# Patient Record
Sex: MALE | Race: WHITE | NOT HISPANIC OR LATINO | Employment: FULL TIME | URBAN - METROPOLITAN AREA
[De-identification: names, ages, dates, MRNs, and addresses within clinical notes are randomized per-mention and may not be internally consistent; named-entity substitution may affect disease eponyms.]

---

## 2018-09-28 ENCOUNTER — OFFICE VISIT (OUTPATIENT)
Dept: URGENT CARE | Facility: CLINIC | Age: 58
End: 2018-09-28
Payer: COMMERCIAL

## 2018-09-28 VITALS
HEIGHT: 70 IN | SYSTOLIC BLOOD PRESSURE: 120 MMHG | WEIGHT: 245 LBS | OXYGEN SATURATION: 97 % | HEART RATE: 72 BPM | TEMPERATURE: 98.2 F | BODY MASS INDEX: 35.07 KG/M2 | RESPIRATION RATE: 16 BRPM | DIASTOLIC BLOOD PRESSURE: 72 MMHG

## 2018-09-28 DIAGNOSIS — G47.30 SLEEP APNEA, UNSPECIFIED TYPE: ICD-10-CM

## 2018-09-28 PROCEDURE — 99202 OFFICE O/P NEW SF 15 MIN: CPT | Performed by: NURSE PRACTITIONER

## 2018-09-28 RX ORDER — MORPHINE SULFATE 15 MG/1
15 TABLET ORAL EVERY 4 HOURS PRN
COMMUNITY
End: 2019-02-15

## 2018-09-28 RX ORDER — GABAPENTIN 300 MG/1
300 CAPSULE ORAL 3 TIMES DAILY
COMMUNITY
End: 2019-04-25

## 2018-09-28 RX ORDER — BUSPIRONE HYDROCHLORIDE 15 MG/1
15 TABLET ORAL 4 TIMES DAILY
COMMUNITY
End: 2019-08-08 | Stop reason: SDUPTHER

## 2018-09-28 RX ORDER — ONDANSETRON 8 MG/1
8 TABLET, ORALLY DISINTEGRATING ORAL EVERY 8 HOURS PRN
COMMUNITY
End: 2021-03-08 | Stop reason: SDUPTHER

## 2018-09-28 RX ORDER — LEVOTHYROXINE SODIUM 0.12 MG/1
125 TABLET ORAL
COMMUNITY
End: 2020-09-16 | Stop reason: SDUPTHER

## 2018-09-28 RX ORDER — MORPHINE SULFATE 30 MG/1
30 TABLET ORAL 3 TIMES DAILY
COMMUNITY
End: 2021-06-29

## 2018-09-28 ASSESSMENT — ENCOUNTER SYMPTOMS: SHORTNESS OF BREATH: 0

## 2018-09-28 NOTE — PROGRESS NOTES
"Subjective:      Christofer Herrera is a 58 y.o. male who presents with Referral Needed (Pulmonologist)    History reviewed. No pertinent past medical history.  Social History     Social History   • Marital status:      Spouse name: N/A   • Number of children: N/A   • Years of education: N/A     Occupational History   • Not on file.     Social History Main Topics   • Smoking status: Never Smoker   • Smokeless tobacco: Never Used   • Alcohol use Not on file   • Drug use: Unknown   • Sexual activity: Not on file     Other Topics Concern   • Not on file     Social History Narrative   • No narrative on file     History reviewed. No pertinent family history.    Allergies: Naproxen    Patient is a 58-year-old male who presents today with request for referral to pulmonology. He has a history of sleep apnea and had an appointment earlier today but was not seen because he did not have a referral. He is in the process of changing insurance and has not had an opportunity to see his new primary care physician yet.           Other   This is a new problem. The problem has been unchanged. Pertinent negatives include no chest pain. Nothing aggravates the symptoms. Treatments tried: CPAP. The treatment provided moderate relief.       Review of Systems   Respiratory: Negative for shortness of breath.    Cardiovascular: Negative for chest pain.   All other systems reviewed and are negative.         Objective:     /72 (BP Location: Right arm, Patient Position: Sitting, BP Cuff Size: Adult)   Pulse 72   Temp 36.8 °C (98.2 °F) (Temporal)   Resp 16   Ht 1.778 m (5' 10\")   Wt 111.1 kg (245 lb)   SpO2 97%   BMI 35.15 kg/m²      Physical Exam   Constitutional: He appears well-developed and well-nourished.   Neck: Normal range of motion. Neck supple.   Cardiovascular: Normal rate and regular rhythm.    Pulmonary/Chest: Effort normal and breath sounds normal.   Skin: Skin is warm and dry. Capillary refill takes less than 2 seconds. "   Psychiatric: He has a normal mood and affect. His behavior is normal.   Vitals reviewed.              Assessment/Plan:   History of sleep apnea  -Referral to pulmonology given  -follow up otherwise for any further questions or concerns.   There are no diagnoses linked to this encounter.

## 2018-10-09 ENCOUNTER — HOSPITAL ENCOUNTER (OUTPATIENT)
Dept: LAB | Facility: MEDICAL CENTER | Age: 58
End: 2018-10-09
Attending: UROLOGY
Payer: COMMERCIAL

## 2018-10-09 PROCEDURE — 84153 ASSAY OF PSA TOTAL: CPT

## 2018-10-09 PROCEDURE — 84403 ASSAY OF TOTAL TESTOSTERONE: CPT

## 2018-10-10 LAB
PSA SERPL-MCNC: 0.95 NG/ML (ref 0–4)
TESTOST SERPL-MCNC: 295 NG/DL (ref 175–781)

## 2018-10-15 ENCOUNTER — OFFICE VISIT (OUTPATIENT)
Dept: MEDICAL GROUP | Facility: PHYSICIAN GROUP | Age: 58
End: 2018-10-15
Payer: COMMERCIAL

## 2018-10-15 VITALS
HEART RATE: 89 BPM | SYSTOLIC BLOOD PRESSURE: 138 MMHG | DIASTOLIC BLOOD PRESSURE: 86 MMHG | TEMPERATURE: 98.5 F | OXYGEN SATURATION: 94 % | HEIGHT: 70 IN | WEIGHT: 245.5 LBS | BODY MASS INDEX: 35.15 KG/M2 | RESPIRATION RATE: 18 BRPM

## 2018-10-15 DIAGNOSIS — G47.30 SLEEP APNEA, UNSPECIFIED TYPE: ICD-10-CM

## 2018-10-15 DIAGNOSIS — Z12.12 SCREENING FOR COLORECTAL CANCER: ICD-10-CM

## 2018-10-15 DIAGNOSIS — Z98.1 HISTORY OF SPINAL FUSION: ICD-10-CM

## 2018-10-15 DIAGNOSIS — Z23 NEED FOR VACCINATION: ICD-10-CM

## 2018-10-15 DIAGNOSIS — E66.9 OBESITY (BMI 35.0-39.9 WITHOUT COMORBIDITY): ICD-10-CM

## 2018-10-15 DIAGNOSIS — M54.40 CHRONIC LOW BACK PAIN WITH SCIATICA, SCIATICA LATERALITY UNSPECIFIED, UNSPECIFIED BACK PAIN LATERALITY: ICD-10-CM

## 2018-10-15 DIAGNOSIS — Z12.11 SCREENING FOR COLORECTAL CANCER: ICD-10-CM

## 2018-10-15 DIAGNOSIS — Z96.89 SPINAL CORD STIMULATOR STATUS: ICD-10-CM

## 2018-10-15 DIAGNOSIS — Z85.46 HISTORY OF PROSTATE CANCER: ICD-10-CM

## 2018-10-15 DIAGNOSIS — G89.29 CHRONIC LOW BACK PAIN WITH SCIATICA, SCIATICA LATERALITY UNSPECIFIED, UNSPECIFIED BACK PAIN LATERALITY: ICD-10-CM

## 2018-10-15 PROCEDURE — 90471 IMMUNIZATION ADMIN: CPT | Performed by: FAMILY MEDICINE

## 2018-10-15 PROCEDURE — 99204 OFFICE O/P NEW MOD 45 MIN: CPT | Mod: 25 | Performed by: FAMILY MEDICINE

## 2018-10-15 PROCEDURE — 90686 IIV4 VACC NO PRSV 0.5 ML IM: CPT | Performed by: FAMILY MEDICINE

## 2018-10-15 RX ORDER — TESTOSTERONE CYPIONATE 1000 MG/10ML
INJECTION, SOLUTION INTRAMUSCULAR
COMMUNITY
End: 2019-04-25

## 2018-10-15 RX ORDER — OXYCODONE HYDROCHLORIDE 10 MG/1
10 TABLET ORAL 4 TIMES DAILY PRN
COMMUNITY

## 2018-10-15 RX ORDER — FAMOTIDINE 40 MG/1
40 TABLET, FILM COATED ORAL PRN
COMMUNITY
Start: 2018-09-14

## 2018-10-15 ASSESSMENT — ENCOUNTER SYMPTOMS
HEMOPTYSIS: 0
PALPITATIONS: 0
CONSTITUTIONAL NEGATIVE: 1
RESPIRATORY NEGATIVE: 1
BACK PAIN: 1
CARDIOVASCULAR NEGATIVE: 1
BRUISES/BLEEDS EASILY: 0
DOUBLE VISION: 0
MYALGIAS: 0
DIZZINESS: 0
CHILLS: 0
EYES NEGATIVE: 1
NEUROLOGICAL NEGATIVE: 1
NAUSEA: 0
TINGLING: 0
GASTROINTESTINAL NEGATIVE: 1
DEPRESSION: 0
BLURRED VISION: 0
HEADACHES: 0
COUGH: 0
FEVER: 0
PSYCHIATRIC NEGATIVE: 1
HEARTBURN: 0

## 2018-10-15 ASSESSMENT — PATIENT HEALTH QUESTIONNAIRE - PHQ9: CLINICAL INTERPRETATION OF PHQ2 SCORE: 0

## 2018-10-15 NOTE — LETTER
[facility]  Dirk Romo M.D.  [address]  [facility] [address]  Fax: [phone]   Authorization for Release/Disclosure of   Protected Health Information   Name: CHRISTOFER PARSONS : 1960 SSN: [id number]   Address: [address] [place] [id number] Phone:    [phone] (home)    I authorize the entity listed below to release/disclose the PHI below to:   [facility]/Dirk Romo M.D. and Dirk Romo M.D.   Provider or Entity Name:     Address   City, State, [facility]   Phone:      Fax:     Reason for request: continuity of care   Information to be released:    [  ] LAST COLONOSCOPY,  including any PATH REPORT and follow-up  [  ] LAST FIT/COLOGUARD RESULT [  ] LAST DEXA  [  ] LAST MAMMOGRAM  [  ] LAST PAP  [  ] LAST LABS [  ] RETINA EXAM REPORT  [  ] IMMUNIZATION RECORDS  [  ] Release all info      [  ] Check here and initial the line next to each item to release ALL health information INCLUDING  _____ Care and treatment for drug and / or alcohol abuse  _____ HIV testing, infection status, or AIDS  _____ Genetic Testing    DATES OF SERVICE OR TIME PERIOD TO BE DISCLOSED: _____________  I understand and acknowledge that:  * This Authorization may be revoked at any time by you in writing, except if your health information has already been used or disclosed.  * Your health information that will be used or disclosed as a result of you signing this authorization could be re-disclosed by the recipient. If this occurs, your re-disclosed health information may no longer be protected by State or Federal laws.  * You may refuse to sign this Authorization. Your refusal will not affect your ability to obtain treatment.  * This Authorization becomes effective upon signing and will  on (date) __________.      If no date is indicated, this Authorization will  one (1) year from the signature date.    Name: Christofer Parsons    Signature:   Date:     10/15/2018       PLEASE FAX REQUESTED RECORDS  BACK TO: [phone]

## 2018-10-15 NOTE — PROGRESS NOTES
Subjective:      Christofer Herrera is a 58 y.o. male who presents with Referral Needed (Pulmonary- Pre auth is what is requested)            New patient visit, works as a  in auto repair business in [place]  Needs new referrals to pulm for sleep apnea, urology for f/u on prostate cancer treated years ago and pain clinic for spinal stimulator status for chronic back pain    1. Screening for colorectal cancer    - REFERRAL TO GI FOR COLONOSCOPY    2. Need for vaccination    - Influenza Vaccine Quad Injection >3Y (PF)    3. Sleep apnea, unspecified type    - REFERRAL TO PULMONOLOGY    4. History of spinal fusion    - REFERRAL TO PAIN CLINIC    5. Chronic low back pain with sciatica, sciatica laterality unspecified, unspecified back pain laterality    - REFERRAL TO PAIN CLINIC    6. Spinal cord stimulator status      7. History of prostate cancer    - REFERRAL TO UROLOGY    8. Obesity (BMI 35.0-39.9 without comorbidity)    - Patient identified as having weight management issue.  Appropriate orders and counseling given.    Past Medical History:  No date: Cancer (HCC)      Comment:  treated with brachytherapy prostate  No date: Glucose intolerance  No date: Low back pain  No date: Sleep apnea  Past Surgical History:  No date: APPENDECTOMY  No date: LAMINOTOMY      Comment:  fusion and stimulator  Smoking status: Never Smoker                                                              Smokeless tobacco: Never Used                        History reviewed.  No pertinent family history.      Current Outpatient Prescriptions: •  oxyCODONE immediate release (ROXICODONE) 10 MG immediate release tablet, Take 10 mg by mouth 3 times a day as needed for Moderate Pain., Disp: , Rfl: •  PENNSAID 2 % Solution, , Disp: , Rfl: •  FLECTOR 1.3 % Patch, Apply 1 Patch to skin as directed every bedtime., Disp: , Rfl: •  famotidine (PEPCID) 40 MG Tab, Take 40 mg by mouth 2 times a day., Disp: , Rfl: •  Testosterone Cypionate 100 MG/ML Solution, by  "Intramuscular route., Disp: , Rfl: •  morphine (MS IR) 30 MG tablet, Take 30 mg by mouth 3 times a day., Disp: , Rfl: •  levothyroxine (SYNTHROID) 125 MCG Tab, Take 125 mcg by mouth Every morning on an empty stomach., Disp: , Rfl: •  gabapentin (NEURONTIN) 300 MG Cap, Take 300 mg by mouth 3 times a day., Disp: , Rfl: •  busPIRone (BUSPAR) 15 MG tablet, Take 15 mg by mouth 2 times a day., Disp: , Rfl: •  ondansetron (ZOFRAN ODT) 8 MG TABLET DISPERSIBLE, Take 8 mg by mouth every 8 hours as needed for Nausea., Disp: , Rfl: •  morphine (MS IR) 15 MG tablet, Take 15 mg by mouth every four hours as needed for Severe Pain., Disp: , Rfl:     Patient was instructed on the use of medications, either prescriptions or OTC and informed on when the appropriate follow up time period should be. In addition, patient was also instructed that should any acute worsening occur that they should notify this clinic asap or call 911.            Review of Systems   Constitutional: Negative.  Negative for chills and fever.   HENT: Negative.  Negative for hearing loss.    Eyes: Negative.  Negative for blurred vision and double vision.   Respiratory: Negative.  Negative for cough and hemoptysis.    Cardiovascular: Negative.  Negative for chest pain and palpitations.   Gastrointestinal: Negative.  Negative for heartburn and nausea.   Genitourinary: Negative.  Negative for dysuria.   Musculoskeletal: Positive for back pain. Negative for myalgias.   Skin: Negative.  Negative for rash.   Neurological: Negative.  Negative for dizziness, tingling and headaches.   Endo/Heme/Allergies: Negative.  Does not bruise/bleed easily.   Psychiatric/Behavioral: Negative.  Negative for depression and suicidal ideas.   All other systems reviewed and are negative.         Objective:     /86 (BP Location: Left arm, Patient Position: Sitting)   Pulse 89   Temp 36.9 °C (98.5 °F) (Temporal)   Resp 18   Ht 1.778 m (5' 10\")   Wt 111.4 kg (245 lb 8 oz)   SpO2 " 94%   BMI 35.23 kg/m²      Physical Exam   Constitutional: He is oriented to person, place, and time. He appears well-developed and well-nourished. No distress.   HENT:   Head: Normocephalic and atraumatic.   Mouth/Throat: Oropharynx is clear and moist. No oropharyngeal exudate.   Eyes: Pupils are equal, round, and reactive to light.   Cardiovascular: Normal rate, regular rhythm, normal heart sounds and intact distal pulses.  Exam reveals no gallop and no friction rub.    No murmur heard.  Pulmonary/Chest: Effort normal and breath sounds normal. No respiratory distress. He has no wheezes. He has no rales. He exhibits no tenderness.   Neurological: He is alert and oriented to person, place, and time.   Skin: He is not diaphoretic.   Psychiatric: He has a normal mood and affect. His behavior is normal. Judgment and thought content normal.   Nursing note and vitals reviewed.              Assessment/Plan:     1. Screening for colorectal cancer    - REFERRAL TO GI FOR COLONOSCOPY    2. Need for vaccination    - Influenza Vaccine Quad Injection >3Y (PF)    3. Sleep apnea, unspecified type    - REFERRAL TO PULMONOLOGY    4. History of spinal fusion    - REFERRAL TO PAIN CLINIC    5. Chronic low back pain with sciatica, sciatica laterality unspecified, unspecified back pain laterality    - REFERRAL TO PAIN CLINIC    6. Spinal cord stimulator status      7. History of prostate cancer    - REFERRAL TO UROLOGY    8. Obesity (BMI 35.0-39.9 without comorbidity)    - Patient identified as having weight management issue.  Appropriate orders and counseling given.

## 2019-02-13 ENCOUNTER — HOSPITAL ENCOUNTER (EMERGENCY)
Facility: MEDICAL CENTER | Age: 59
End: 2019-02-13
Attending: EMERGENCY MEDICINE
Payer: COMMERCIAL

## 2019-02-13 ENCOUNTER — APPOINTMENT (OUTPATIENT)
Dept: RADIOLOGY | Facility: MEDICAL CENTER | Age: 59
End: 2019-02-13
Attending: EMERGENCY MEDICINE
Payer: COMMERCIAL

## 2019-02-13 VITALS
HEART RATE: 70 BPM | TEMPERATURE: 98.1 F | BODY MASS INDEX: 36.38 KG/M2 | RESPIRATION RATE: 16 BRPM | SYSTOLIC BLOOD PRESSURE: 151 MMHG | WEIGHT: 253.53 LBS | DIASTOLIC BLOOD PRESSURE: 90 MMHG | OXYGEN SATURATION: 92 %

## 2019-02-13 DIAGNOSIS — R07.9 ACUTE CHEST PAIN: ICD-10-CM

## 2019-02-13 DIAGNOSIS — M79.604 RIGHT LEG PAIN: ICD-10-CM

## 2019-02-13 LAB
ALBUMIN SERPL BCP-MCNC: 4.6 G/DL (ref 3.2–4.9)
ALBUMIN/GLOB SERPL: 1.7 G/DL
ALP SERPL-CCNC: 113 U/L (ref 30–99)
ALT SERPL-CCNC: 24 U/L (ref 2–50)
ANION GAP SERPL CALC-SCNC: 9 MMOL/L (ref 0–11.9)
APTT PPP: 34.8 SEC (ref 24.7–36)
AST SERPL-CCNC: 21 U/L (ref 12–45)
BASOPHILS # BLD AUTO: 0.7 % (ref 0–1.8)
BASOPHILS # BLD: 0.04 K/UL (ref 0–0.12)
BILIRUB SERPL-MCNC: 0.8 MG/DL (ref 0.1–1.5)
BNP SERPL-MCNC: 8 PG/ML (ref 0–100)
BUN SERPL-MCNC: 13 MG/DL (ref 8–22)
CALCIUM SERPL-MCNC: 9.8 MG/DL (ref 8.5–10.5)
CHLORIDE SERPL-SCNC: 105 MMOL/L (ref 96–112)
CO2 SERPL-SCNC: 22 MMOL/L (ref 20–33)
CREAT SERPL-MCNC: 1.02 MG/DL (ref 0.5–1.4)
EKG IMPRESSION: NORMAL
EOSINOPHIL # BLD AUTO: 0.06 K/UL (ref 0–0.51)
EOSINOPHIL NFR BLD: 1 % (ref 0–6.9)
ERYTHROCYTE [DISTWIDTH] IN BLOOD BY AUTOMATED COUNT: 42.9 FL (ref 35.9–50)
GLOBULIN SER CALC-MCNC: 2.7 G/DL (ref 1.9–3.5)
GLUCOSE SERPL-MCNC: 123 MG/DL (ref 65–99)
HCT VFR BLD AUTO: 48.5 % (ref 42–52)
HGB BLD-MCNC: 16 G/DL (ref 14–18)
IMM GRANULOCYTES # BLD AUTO: 0.03 K/UL (ref 0–0.11)
IMM GRANULOCYTES NFR BLD AUTO: 0.5 % (ref 0–0.9)
INR PPP: 1.04 (ref 0.87–1.13)
LIPASE SERPL-CCNC: 3 U/L (ref 11–82)
LYMPHOCYTES # BLD AUTO: 1.13 K/UL (ref 1–4.8)
LYMPHOCYTES NFR BLD: 18.7 % (ref 22–41)
MCH RBC QN AUTO: 31.1 PG (ref 27–33)
MCHC RBC AUTO-ENTMCNC: 33 G/DL (ref 33.7–35.3)
MCV RBC AUTO: 94.4 FL (ref 81.4–97.8)
MONOCYTES # BLD AUTO: 0.31 K/UL (ref 0–0.85)
MONOCYTES NFR BLD AUTO: 5.1 % (ref 0–13.4)
NEUTROPHILS # BLD AUTO: 4.48 K/UL (ref 1.82–7.42)
NEUTROPHILS NFR BLD: 74 % (ref 44–72)
NRBC # BLD AUTO: 0 K/UL
NRBC BLD-RTO: 0 /100 WBC
PLATELET # BLD AUTO: 255 K/UL (ref 164–446)
PMV BLD AUTO: 10 FL (ref 9–12.9)
POTASSIUM SERPL-SCNC: 3.7 MMOL/L (ref 3.6–5.5)
PROT SERPL-MCNC: 7.3 G/DL (ref 6–8.2)
PROTHROMBIN TIME: 13.7 SEC (ref 12–14.6)
RBC # BLD AUTO: 5.14 M/UL (ref 4.7–6.1)
SODIUM SERPL-SCNC: 136 MMOL/L (ref 135–145)
TROPONIN I SERPL-MCNC: <0.01 NG/ML (ref 0–0.04)
TROPONIN I SERPL-MCNC: <0.01 NG/ML (ref 0–0.04)
WBC # BLD AUTO: 6.1 K/UL (ref 4.8–10.8)

## 2019-02-13 PROCEDURE — 71045 X-RAY EXAM CHEST 1 VIEW: CPT

## 2019-02-13 PROCEDURE — 99284 EMERGENCY DEPT VISIT MOD MDM: CPT

## 2019-02-13 PROCEDURE — 93005 ELECTROCARDIOGRAM TRACING: CPT

## 2019-02-13 PROCEDURE — 85610 PROTHROMBIN TIME: CPT

## 2019-02-13 PROCEDURE — 80053 COMPREHEN METABOLIC PANEL: CPT

## 2019-02-13 PROCEDURE — 83880 ASSAY OF NATRIURETIC PEPTIDE: CPT

## 2019-02-13 PROCEDURE — 85730 THROMBOPLASTIN TIME PARTIAL: CPT

## 2019-02-13 PROCEDURE — 84484 ASSAY OF TROPONIN QUANT: CPT

## 2019-02-13 PROCEDURE — 85025 COMPLETE CBC W/AUTO DIFF WBC: CPT

## 2019-02-13 PROCEDURE — 93971 EXTREMITY STUDY: CPT | Mod: RT

## 2019-02-13 PROCEDURE — 36415 COLL VENOUS BLD VENIPUNCTURE: CPT

## 2019-02-13 PROCEDURE — 93005 ELECTROCARDIOGRAM TRACING: CPT | Performed by: EMERGENCY MEDICINE

## 2019-02-13 PROCEDURE — 83690 ASSAY OF LIPASE: CPT

## 2019-02-13 ASSESSMENT — LIFESTYLE VARIABLES: DO YOU DRINK ALCOHOL: NO

## 2019-02-14 ENCOUNTER — TELEPHONE (OUTPATIENT)
Dept: CARDIOLOGY | Facility: MEDICAL CENTER | Age: 59
End: 2019-02-14

## 2019-02-14 NOTE — ED NOTES
Agree with triage note, pt ambulatory to the bathroom and into green 28 with a steady gait. Pt placed on cardiac monitor, bp cuff and pulse ox. EKG obtained in triage.

## 2019-02-14 NOTE — ED PROVIDER NOTES
ER Provider Note     Scribed for Harry Sanchez M.D. by Nelli Jernigan. 2/13/2019, 8:44 PM.    Primary Care Provider: Dirk Romo M.D.  Means of Arrival: Walk in    History obtained from: Patient  History limited by: None     CHIEF COMPLAINT  Chief Complaint   Patient presents with   • Leg Pain     pt states hx of right leg pain that comes and goes/ today pain increased/ pain to be on lateral thigh/ denies trauma   • Chest Pain     pt states left sided CP       HPI  Christofer Herrera is a 58 y.o. male with a history of prediabetes and sleep apnea who presents to the Emergency Department complaining of intermittent right thigh pain onset few months ago with worsening severity. He reports new onset of tingling in bilateral toes. Patient reports he has sustained multiple injuries in the past which resulted in a spine fusion 7 years ago. He denies history of diabetes. He also complains of onset of chest pain 3 hours ago that he states has subsided since. Denies shortness of breath, nausea, or vomiting.     REVIEW OF SYSTEMS  See HPI for further details. All other systems are negative.     PAST MEDICAL HISTORY   has a past medical history of Cancer (HCC); Glucose intolerance; Low back pain; and Sleep apnea.    SURGICAL HISTORY   has a past surgical history that includes laminotomy and appendectomy.    SOCIAL HISTORY  Social History   Substance Use Topics   • Smoking status: Never Smoker   • Smokeless tobacco: Never Used   • Alcohol use None noted       History   Drug Use No     FAMILY HISTORY  None noted    CURRENT MEDICATIONS  No current facility-administered medications on file prior to encounter.      Current Outpatient Prescriptions on File Prior to Encounter   Medication Sig Dispense Refill   • oxyCODONE immediate release (ROXICODONE) 10 MG immediate release tablet Take 10 mg by mouth 3 times a day as needed for Moderate Pain.     • PENNSAID 2 % Solution      • FLECTOR 1.3 % Patch Apply 1 Patch to skin as directed  every bedtime.     • famotidine (PEPCID) 40 MG Tab Take 40 mg by mouth 2 times a day.     • Testosterone Cypionate 100 MG/ML Solution by Intramuscular route.     • morphine (MS IR) 30 MG tablet Take 30 mg by mouth 3 times a day.     • morphine (MS IR) 15 MG tablet Take 15 mg by mouth every four hours as needed for Severe Pain.     • levothyroxine (SYNTHROID) 125 MCG Tab Take 125 mcg by mouth Every morning on an empty stomach.     • gabapentin (NEURONTIN) 300 MG Cap Take 300 mg by mouth 3 times a day.     • busPIRone (BUSPAR) 15 MG tablet Take 15 mg by mouth 2 times a day.     • ondansetron (ZOFRAN ODT) 8 MG TABLET DISPERSIBLE Take 8 mg by mouth every 8 hours as needed for Nausea.         ALLERGIES  Allergies   Allergen Reactions   • Naproxen Swelling   • Prozac [Fluoxetine] Unspecified     Patient states this would make him unemotional         PHYSICAL EXAM  VITAL SIGNS: /90   Pulse 67   Temp 36.7 °C (98.1 °F) (Temporal)   Resp 15   Wt 115 kg (253 lb 8.5 oz)   SpO2 95%   BMI 36.38 kg/m²      Constitutional: Alert in no apparent distress.  HENT: No signs of trauma, Bilateral external ears normal, Nose normal.   Eyes: Pupils are equal and reactive, Conjunctiva normal, Non-icteric.   Neck: Normal range of motion, No tenderness, Supple, No stridor.   Cardiovascular: Regular rate and rhythm, no murmurs.   Thorax & Lungs: Normal breath sounds, No respiratory distress, No wheezing, No chest tenderness.   Abdomen: Bowel sounds normal, Soft, No tenderness, No masses, No pulsatile masses. No peritoneal signs.  Skin: Warm, Dry, No erythema, No rash.   Extremities: Strong dorsalis pedis pulses, Slight peripheral edema to bilateral lower extremities, Slightly greater on right than left. No tenderness, No cyanosis.  Musculoskeletal: Good range of motion in all major joints. No tenderness to palpation or major deformities noted.   Neurologic: Alert , Normal motor function, Normal sensory function, No focal deficits  noted.   Psychiatric: Affect normal, Judgment normal, Mood normal.     DIAGNOSTIC STUDIES / PROCEDURES    EKG Interpretation:  Interpreted by me    12 Lead EKG interpreted by me to show:  Normal sinus rhythm  Rate 62  Axis: Normal  Intervals: Normal  Normal T waves  Normal ST segments  My impression of this EKG: Does not indicate ischemia or arrhythmia at this time.     LABS  Labs Reviewed   CBC WITH DIFFERENTIAL - Abnormal; Notable for the following:        Result Value    MCHC 33.0 (*)     Neutrophils-Polys 74.00 (*)     Lymphocytes 18.70 (*)     All other components within normal limits    Narrative:     Indicate which anticoagulants the patient is on:->UNKNOWN   COMP METABOLIC PANEL - Abnormal; Notable for the following:     Glucose 123 (*)     Alkaline Phosphatase 113 (*)     All other components within normal limits    Narrative:     Indicate which anticoagulants the patient is on:->UNKNOWN   LIPASE - Abnormal; Notable for the following:     Lipase 3 (*)     All other components within normal limits    Narrative:     Indicate which anticoagulants the patient is on:->UNKNOWN   TROPONIN    Narrative:     Indicate which anticoagulants the patient is on:->UNKNOWN   BTYPE NATRIURETIC PEPTIDE    Narrative:     Indicate which anticoagulants the patient is on:->UNKNOWN   PROTHROMBIN TIME    Narrative:     Indicate which anticoagulants the patient is on:->UNKNOWN   APTT    Narrative:     Indicate which anticoagulants the patient is on:->UNKNOWN   ESTIMATED GFR    Narrative:     Indicate which anticoagulants the patient is on:->UNKNOWN   TROPONIN   All labs reviewed by me.    RADIOLOGY  US-EXTREMITY VENOUS LOWER UNILAT RIGHT   Final Result      DX-CHEST-PORTABLE (1 VIEW)   Final Result      No acute cardiopulmonary abnormality.           The radiologist's interpretation of all radiological studies have been reviewed by me.    COURSE & MEDICAL DECISION MAKING  Pertinent Labs & Imaging studies reviewed. (See chart for  details)    This is a 58 y.o. male that presents with atypical intermittent chest pain however he is more concerned about some leg pain and swelling.  He currently has no chest pain.  I will get an ultrasound to evaluate for DVT.  I will get a troponin to evaluate the patient's chest symptoms looking for evidence of ischemia.  His EKG shows no ischemia at this time.  I will get an x-ray to evaluate for pneumonia.    8:44 PM Patient seen and examined at bedside. Ordered DX chest, US right lower extremity, EKG, Troponin, Estimated GFR, CBC, CMP, and other labs.     10:14 PM Reviewed patient's radiology results which was negative for DVT.     10:56 PM Patient reevaluated at bedside. He is resting comfortably in bed. Discussed diagnostic results with patient which did not show any acute abnormalities. He was instructed to follow up with cardiology in the next 2 days and PCP for further evaluation. Patient understands and is comfortable to discharge home with instructions on supportive care. The patient will return for new or worsening symptoms and is stable at the time of discharge.    The patient has no leukocytosis or anemia.  He has no electrolyte derangements.  His lipase is normal.  He has no coagulopathies and 2- troponins.  His chest x-ray is normal.  His DVT study is also normal.  At this time I will discharge the patient home with strict return precautions and follow-up with his primary care physician.    DISPOSITION:  Patient will be discharged home in stable condition.    FOLLOW UP:  Dirk Romo M.D.  [address] [facility] [address]  [phone]    In 2 days        OUTPATIENT MEDICATIONS:  Discharge Medication List as of 2/13/2019 11:15 PM        FINAL IMPRESSION  1. Acute chest pain    2. Right leg pain          Nelli DELGADO (Eri), am scribing for, and in the presence of, Harry Sanchez M.D..  Electronically signed by: Nelli Kovacs), 2/13/2019  Harry DELGADO M.D.  personally performed the services described in this documentation, as scribed by Nelli Jernigan in my presence, and it is both accurate and complete. C.     The note accurately reflects work and decisions made by me.  Harry Sanchez  2/14/2019  1:49 AM

## 2019-02-14 NOTE — ED TRIAGE NOTES
Chief Complaint   Patient presents with   • Leg Pain     pt states hx of right leg pain that comes and goes/ today pain increased/ pain to be on lateral thigh/ denies trauma   • Chest Pain     pt states left sided CP     PT to triage rm for ekg.

## 2019-02-14 NOTE — ED NOTES
Pt re-vitaled, updated in the chart.   Pt reports leg pain has subsided and chest pain still comes and goes.   Pt also reports concerns of flank pain every morning he wakes up.

## 2019-02-14 NOTE — TELEPHONE ENCOUNTER
Spoke with patient about appointment with [facility]-. Patient has never seen cardiology before. Patient is being seen for EDCP.

## 2019-02-15 ENCOUNTER — OFFICE VISIT (OUTPATIENT)
Dept: CARDIOLOGY | Facility: MEDICAL CENTER | Age: 59
End: 2019-02-15
Payer: COMMERCIAL

## 2019-02-15 VITALS
WEIGHT: 252 LBS | OXYGEN SATURATION: 94 % | DIASTOLIC BLOOD PRESSURE: 80 MMHG | HEART RATE: 98 BPM | BODY MASS INDEX: 36.08 KG/M2 | SYSTOLIC BLOOD PRESSURE: 130 MMHG | HEIGHT: 70 IN

## 2019-02-15 DIAGNOSIS — G47.30 SLEEP APNEA, UNSPECIFIED TYPE: ICD-10-CM

## 2019-02-15 DIAGNOSIS — E66.9 OBESITY (BMI 35.0-39.9 WITHOUT COMORBIDITY): ICD-10-CM

## 2019-02-15 DIAGNOSIS — R73.03 PRE-DIABETES: ICD-10-CM

## 2019-02-15 DIAGNOSIS — Z91.89 OTHER SPECIFIED PERSONAL RISK FACTORS, NOT ELSEWHERE CLASSIFIED: ICD-10-CM

## 2019-02-15 DIAGNOSIS — I20.89 OTHER FORMS OF ANGINA PECTORIS: ICD-10-CM

## 2019-02-15 PROCEDURE — 99244 OFF/OP CNSLTJ NEW/EST MOD 40: CPT | Performed by: INTERNAL MEDICINE

## 2019-02-15 ASSESSMENT — ENCOUNTER SYMPTOMS
WHEEZING: 1
COUGH: 1
BACK PAIN: 1
FALLS: 1
SHORTNESS OF BREATH: 1

## 2019-02-15 NOTE — PROGRESS NOTES
Cardiology Initial Consultation Note    Date of note:    2/15/2019    Primary Care Provider: Dirk Romo M.D.  Referring Provider: Harry Sanchez M.D.     Patient Name: Christofer Herrera   YOB: 1960  MRN:              [id number]    Chief Complaint: chest pain    History of Present Illness: Christofer Herrera is a 58 y.o. male whose current medical problems include pre-diabetes, sleep apnea, obesity  who is here for cardiac consultation for chest pain.    Recently had severe left leg pain and presented to the ER for evaluation on 2/13/2019.  This was associated with 5/10 sharp stabbing left sided chest pain which resolved spontaneously after around 1-2 hours.  This usually resolves with simethicone, and he has these chest pain episodes around twice a month.     Of note, he gets severe gas and reflux with drip coffee.      BP at home is 110s/70s, does have white coat syndrome.     Exercises on elliptical machine 30 minutes.  No chest pain, occasional shortness of breath. Walks 6-8 miles a day at work.     Currently has a URI.     Of note, he does have a history of significant hypertriglyceridemia, which did improve after he stopped eating ice cream every day.       Review of Systems   Cardiovascular: Positive for chest pain and leg swelling.   Respiratory: Positive for cough, shortness of breath and wheezing.    Musculoskeletal: Positive for back pain and falls.       All other systems reviewed and discussed using a comprehensive questionnaire which has been scanned into [employer] as part of this appointment.          Past Medical History:   Diagnosis Date   • Cancer (HCC)     treated with brachytherapy prostate   • Low back pain    • Pre-diabetes    • Sleep apnea          Past Surgical History:   Procedure Laterality Date   • APPENDECTOMY     • LAMINOTOMY      fusion and stimulator   • SPINAL CORD STIMULATOR      failed lead, off as of 12/2018         Current Outpatient Prescriptions   Medication  "Sig Dispense Refill   • oxyCODONE immediate release (ROXICODONE) 10 MG immediate release tablet Take 10 mg by mouth 3 times a day as needed for Moderate Pain.     • PENNSAID 2 % Solution      • FLECTOR 1.3 % Patch Apply 1 Patch to skin as directed every bedtime.     • Testosterone Cypionate 100 MG/ML Solution by Intramuscular route.     • morphine (MS IR) 30 MG tablet Take 30 mg by mouth 3 times a day.     • levothyroxine (SYNTHROID) 125 MCG Tab Take 125 mcg by mouth Every morning on an empty stomach.     • gabapentin (NEURONTIN) 300 MG Cap Take 300 mg by mouth 3 times a day.     • busPIRone (BUSPAR) 15 MG tablet Take 15 mg by mouth 4 times a day.     • famotidine (PEPCID) 40 MG Tab Take 40 mg by mouth 2 times a day.     • ondansetron (ZOFRAN ODT) 8 MG TABLET DISPERSIBLE Take 8 mg by mouth every 8 hours as needed for Nausea.       No current facility-administered medications for this visit.          Allergies   Allergen Reactions   • Naproxen Swelling   • Prozac [Fluoxetine] Unspecified     Patient states this would make him unemotional           Family History   Problem Relation Age of Onset   • Hypertension Brother 16   • Heart Attack Paternal Grandfather          Social History     Social History   • Marital status:      Spouse name: N/A   • Number of children: N/A   • Years of education: N/A     Occupational History   • Not on file.     Social History Main Topics   • Smoking status: Never Smoker   • Smokeless tobacco: Never Used      Comment: second hand smoke exposure   • Alcohol use 0.0 - 4.2 oz/week   • Drug use: No   • Sexual activity: Yes     Partners: Female      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     Other Topics Concern   • Not on file     Social History Narrative     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          Physical Exam:  Ambulatory Vitals  Blood pressure 130/80, pulse 98, height 1.778 m (5' 10\"), weight 114.3 kg (252 lb), SpO2 94 %.   Oxygen Therapy:  Pulse Oximetry: 94 %  BP Readings from Last 4 " Encounters:   02/15/19 130/80   02/13/19 151/90   10/15/18 138/86   09/28/18 120/72       Weight/BMI: Body mass index is 36.16 kg/m².  Wt Readings from Last 4 Encounters:   02/15/19 114.3 kg (252 lb)   02/13/19 115 kg (253 lb 8.5 oz)   10/15/18 111.4 kg (245 lb 8 oz)   09/28/18 111.1 kg (245 lb)           General: No apparent distress  Eyes: nl conjunctiva  ENT: OP clear, normal external appearance of nose and ears  Neck: JVP 4 cm H2O, no carotid bruits  Lungs: normal respiratory effort, CTAB  Heart: RRR, no murmurs, no rubs or gallops, no edema bilateral lower extremities. No LV/RV heave on cardiac palpatation. 2+ bilateral radial pulses.  2+ bilateral dp pulses.   Abdomen: soft, non tender, non distended, no masses, normal bowel sounds.  No HSM.  Extremities/MSK: no clubbing, no cyanosis  Neurological: No focal sensory deficits  Psychiatric: Appropriate affect, A/O x 3, intact judgement and insight  Skin: Warm extremities      Lab Data Review:  No results found for: CHOLSTRLTOT, LDL, HDL, TRIGLYCERIDE    Lab Results   Component Value Date/Time    SODIUM 136 02/13/2019 05:59 PM    POTASSIUM 3.7 02/13/2019 05:59 PM    CHLORIDE 105 02/13/2019 05:59 PM    CO2 22 02/13/2019 05:59 PM    GLUCOSE 123 (H) 02/13/2019 05:59 PM    BUN 13 02/13/2019 05:59 PM    CREATININE 1.02 02/13/2019 05:59 PM     Lab Results   Component Value Date/Time    ALKPHOSPHAT 113 (H) 02/13/2019 05:59 PM    ASTSGOT 21 02/13/2019 05:59 PM    ALTSGPT 24 02/13/2019 05:59 PM    TBILIRUBIN 0.8 02/13/2019 05:59 PM      Lab Results   Component Value Date/Time    WBC 6.1 02/13/2019 05:59 PM     No components found for: HBGA1C  No components found for: TROPONIN  No components found for: BNP      Cardiac Imaging and Procedures Review:    EKG dated 2/13/2019 : My personal interpretation is NSR, normal EKG.     Radiology test Review:  CXR: 2/13/2019     LUNGS: Clear. No effusions.  PNEUMOTHORAX: None.  LINES AND TUBES: Partially visualized intrathecal lead at  the level of the mid thoracic spine.  MEDIASTINUM: No cardiomegaly.  MUSCULOSKELETAL STRUCTURES: No acute fracture.        Medical Decision Makin. Sleep apnea, unspecified type  Continue CPAP    2. Obesity (BMI 35.0-39.9 without comorbidity) ([facility])  Encouraged improved dietary habits, exercise 4-5 times a week    3. Pre-diabetes  Control with diet/exercise    4. Other forms of angina pectoris ([facility])  Exercise stress test.  If negative, then check CCS, if >100, will start aspirin 81mg PO daily and lipitor 20mg PO daily and check lipids in 3 months.  If <100, will recheck in 5 year.  If 0, no need for recheck, and no need for aspirin, or statin.       5. Other specified personal risk factors, not elsewhere classified  Calcium score.       Return in about 6 months (around 8/15/2019).      Fran Diaz MD, [facility]  [facility], [address]  [address] [address]  Phone: [phone]  Fax: [phone]

## 2019-02-26 ENCOUNTER — HOSPITAL ENCOUNTER (OUTPATIENT)
Dept: RADIOLOGY | Facility: MEDICAL CENTER | Age: 59
End: 2019-02-26
Attending: INTERNAL MEDICINE
Payer: COMMERCIAL

## 2019-02-26 DIAGNOSIS — I20.89 OTHER FORMS OF ANGINA PECTORIS: ICD-10-CM

## 2019-02-26 PROCEDURE — A9502 TC99M TETROFOSMIN: HCPCS

## 2019-02-26 PROCEDURE — 93018 CV STRESS TEST I&R ONLY: CPT | Performed by: INTERNAL MEDICINE

## 2019-02-26 PROCEDURE — 78452 HT MUSCLE IMAGE SPECT MULT: CPT | Mod: 26 | Performed by: INTERNAL MEDICINE

## 2019-02-27 ENCOUNTER — OFFICE VISIT (OUTPATIENT)
Dept: MEDICAL GROUP | Facility: PHYSICIAN GROUP | Age: 59
End: 2019-02-27
Payer: COMMERCIAL

## 2019-02-27 VITALS
OXYGEN SATURATION: 96 % | HEIGHT: 70 IN | WEIGHT: 253 LBS | SYSTOLIC BLOOD PRESSURE: 136 MMHG | BODY MASS INDEX: 36.22 KG/M2 | HEART RATE: 99 BPM | TEMPERATURE: 97.6 F | DIASTOLIC BLOOD PRESSURE: 82 MMHG

## 2019-02-27 DIAGNOSIS — J20.9 ACUTE BRONCHITIS, UNSPECIFIED ORGANISM: ICD-10-CM

## 2019-02-27 PROBLEM — G47.33 OSA ON CPAP: Status: ACTIVE | Noted: 2018-10-15

## 2019-02-27 PROCEDURE — 99214 OFFICE O/P EST MOD 30 MIN: CPT | Performed by: NURSE PRACTITIONER

## 2019-02-27 RX ORDER — METHYLPREDNISOLONE 4 MG/1
TABLET ORAL
Qty: 21 TAB | Refills: 0 | Status: SHIPPED | OUTPATIENT
Start: 2019-02-27 | End: 2019-04-25

## 2019-02-27 RX ORDER — AZITHROMYCIN 250 MG/1
TABLET, FILM COATED ORAL
Qty: 6 TAB | Refills: 0 | Status: SHIPPED | OUTPATIENT
Start: 2019-02-27 | End: 2019-03-04

## 2019-02-27 RX ORDER — ALBUTEROL SULFATE 90 UG/1
2 AEROSOL, METERED RESPIRATORY (INHALATION) EVERY 6 HOURS PRN
Qty: 8.5 G | Refills: 0 | Status: SHIPPED | OUTPATIENT
Start: 2019-02-27 | End: 2020-04-08 | Stop reason: SDUPTHER

## 2019-02-27 NOTE — PROGRESS NOTES
Subjective:     Chief Complaint   Patient presents with   • Congestion     x1 month        HPI  Christofer Herrera is a 58 y.o. male here today for chest congestion. Symptoms started one month ago with chest congestion and cough. Now this past 3 days has worsened with purulent sputum and wheezing. No sinus pressure, sore throat, rhinorrhea. No sob, dyspnea, or hemoptysis. No chest pain or LE edema. No N/V, abd pain, diarrhea. No F/C, fatigue, malaise. Treatments tried: albuterol, steam inhalation, and mucinex. No diagnosed asthma or COPD. No hx of smoking.       The encounter diagnosis was Acute bronchitis, unspecified organism.    Allergies: Naproxen and Prozac [fluoxetine]  Current medicines (including changes today)  Current Outpatient Prescriptions   Medication Sig Dispense Refill   • MethylPREDNISolone (MEDROL DOSEPAK) 4 MG Tablet Therapy Pack Follow package instructions 21 Tab 0   • azithromycin (ZITHROMAX) 250 MG Tab 2 tabs by mouth day 1, 1 tab by mouth days 2-5 6 Tab 0   • albuterol 108 (90 Base) MCG/ACT Aero Soln inhalation aerosol Inhale 2 Puffs by mouth every 6 hours as needed for Shortness of Breath. 8.5 g 0   • oxyCODONE immediate release (ROXICODONE) 10 MG immediate release tablet Take 10 mg by mouth 3 times a day as needed for Moderate Pain.     • PENNSAID 2 % Solution      • FLECTOR 1.3 % Patch Apply 1 Patch to skin as directed every bedtime.     • famotidine (PEPCID) 40 MG Tab Take 40 mg by mouth 2 times a day.     • Testosterone Cypionate 100 MG/ML Solution by Intramuscular route.     • morphine (MS IR) 30 MG tablet Take 30 mg by mouth 3 times a day.     • levothyroxine (SYNTHROID) 125 MCG Tab Take 125 mcg by mouth Every morning on an empty stomach.     • gabapentin (NEURONTIN) 300 MG Cap Take 300 mg by mouth 3 times a day.     • busPIRone (BUSPAR) 15 MG tablet Take 15 mg by mouth 4 times a day.     • ondansetron (ZOFRAN ODT) 8 MG TABLET DISPERSIBLE Take 8 mg by mouth every 8 hours as needed for  "Nausea.       No current facility-administered medications for this visit.        He  has a past medical history of Cancer (HCC); Low back pain; Pre-diabetes; and Sleep apnea. He also has no past medical history of Hyperlipidemia.        ROS  As stated in HPI      Objective:     Blood pressure 136/82, pulse 99, temperature 36.4 °C (97.6 °F), temperature source Temporal, height 1.778 m (5' 10\"), weight 114.8 kg (253 lb), SpO2 96 %. Body mass index is 36.3 kg/m².  Physical Exam:  General: Alert, oriented, in no acute distress.  Eye contact is good, speech goal directed, affect calm  CNs grossly intact.  HEENT: conjunctiva non-injected, sclera non-icteric, EOMs intact. No lid edema or eye drainage.   Pinna normal without skin lesions. TM pearly musa. Gross hearing intact.  Nasal turbinates without edema or drainage.   Oral mucous membranes pink and moist with no lesions. Oropharynx with mild erythema. No exudate   Neck: Supple. No adenopathy or masses in the cervical or supraclavicular regions.  Lungs: unlabored.  Anterior and posterior lobes with expiratory wheezing  CV: regular rate and rhythm.   Ext: no edema  Gait steady.     Assessment and Plan:   Assessment/Plan:  1. Acute bronchitis, unspecified organism  Worsening acute problem. Due to extent of wheezing in lungs, will Rx medrol in addition to z-pack. Enc continue treatment he has been doing as well. Expect improvement in 2-3 days, but expect cough to linger a week following   - MethylPREDNISolone (MEDROL DOSEPAK) 4 MG Tablet Therapy Pack; Follow package instructions  Dispense: 21 Tab; Refill: 0  - azithromycin (ZITHROMAX) 250 MG Tab; 2 tabs by mouth day 1, 1 tab by mouth days 2-5  Dispense: 6 Tab; Refill: 0  - albuterol 108 (90 Base) MCG/ACT Aero Soln inhalation aerosol; Inhale 2 Puffs by mouth every 6 hours as needed for Shortness of Breath.  Dispense: 8.5 g; Refill: 0       Follow up:  Return if symptoms worsen or fail to improve.    Educated in proper " administration of medication(s) ordered today including safety, possible SE, risks, benefits, rationale and alternatives to therapy.   Supportive care, differential diagnoses, and indications for immediate follow-up discussed with patient.    Pathogenesis of diagnosis discussed including typical length and natural progression.    Instructed to return to clinic or nearest emergency department for any change in condition, further concerns, or worsening of symptoms.  Patient states understanding of the plan of care and discharge instructions.      Please note that this dictation was created using voice recognition software. I have made every reasonable attempt to correct obvious errors, but I expect that there are errors of grammar and possibly content that I did not discover before finalizing the note.    Followup: Return if symptoms worsen or fail to improve. sooner should new symptoms or problems arise.

## 2019-03-03 ENCOUNTER — TELEPHONE (OUTPATIENT)
Dept: MEDICAL GROUP | Facility: PHYSICIAN GROUP | Age: 59
End: 2019-03-03

## 2019-03-03 DIAGNOSIS — Z12.11 COLON CANCER SCREENING: ICD-10-CM

## 2019-03-04 ENCOUNTER — HOSPITAL ENCOUNTER (OUTPATIENT)
Dept: RADIOLOGY | Facility: MEDICAL CENTER | Age: 59
End: 2019-03-04
Attending: INTERNAL MEDICINE
Payer: COMMERCIAL

## 2019-03-04 DIAGNOSIS — Z91.89 OTHER SPECIFIED PERSONAL RISK FACTORS, NOT ELSEWHERE CLASSIFIED: ICD-10-CM

## 2019-03-04 PROCEDURE — 4410556 CT-CARDIAC SCORING

## 2019-04-15 ENCOUNTER — OFFICE VISIT (OUTPATIENT)
Dept: MEDICAL GROUP | Facility: PHYSICIAN GROUP | Age: 59
End: 2019-04-15
Payer: COMMERCIAL

## 2019-04-15 VITALS
SYSTOLIC BLOOD PRESSURE: 128 MMHG | WEIGHT: 255 LBS | DIASTOLIC BLOOD PRESSURE: 70 MMHG | HEART RATE: 79 BPM | HEIGHT: 71 IN | OXYGEN SATURATION: 92 % | RESPIRATION RATE: 16 BRPM | BODY MASS INDEX: 35.7 KG/M2 | TEMPERATURE: 98.4 F

## 2019-04-15 DIAGNOSIS — Z01.810 PREOP CARDIOVASCULAR EXAM: ICD-10-CM

## 2019-04-15 DIAGNOSIS — Z98.1 HISTORY OF SPINAL FUSION: ICD-10-CM

## 2019-04-15 DIAGNOSIS — Z96.89 SPINAL CORD STIMULATOR STATUS: ICD-10-CM

## 2019-04-15 PROCEDURE — 99214 OFFICE O/P EST MOD 30 MIN: CPT | Performed by: FAMILY MEDICINE

## 2019-04-15 ASSESSMENT — ENCOUNTER SYMPTOMS
HEADACHES: 0
CHILLS: 0
CARDIOVASCULAR NEGATIVE: 1
BRUISES/BLEEDS EASILY: 0
MYALGIAS: 0
PALPITATIONS: 0
DOUBLE VISION: 0
GASTROINTESTINAL NEGATIVE: 1
BACK PAIN: 1
BLURRED VISION: 0
CONSTITUTIONAL NEGATIVE: 1
RESPIRATORY NEGATIVE: 1
COUGH: 0
HEARTBURN: 0
NAUSEA: 0
DIZZINESS: 0
NEUROLOGICAL NEGATIVE: 1
FEVER: 0
PSYCHIATRIC NEGATIVE: 1
HEMOPTYSIS: 0
TINGLING: 0
EYES NEGATIVE: 1
DEPRESSION: 0

## 2019-04-15 ASSESSMENT — PATIENT HEALTH QUESTIONNAIRE - PHQ9: CLINICAL INTERPRETATION OF PHQ2 SCORE: 0

## 2019-04-15 NOTE — PROGRESS NOTES
Subjective:      Christofer Herrera is a 59 y.o. male who presents with Hospital Follow-up (leg pain)            Patient with a spinal stimulator placed last year but one of the leads came off so scheduled to have this fixed next month  Will need preop labs and cxr  Had stress test done that was normal so no ekg needed  No cp nor sob    1. History of spinal fusion    - Comp Metabolic Panel; Future  - CBC WITH DIFFERENTIAL; Future  - URINALYSIS; Future  - DX-CHEST-2 VIEWS; Future    2. Preop cardiovascular exam    - Comp Metabolic Panel; Future  - CBC WITH DIFFERENTIAL; Future  - URINALYSIS; Future  - DX-CHEST-2 VIEWS; Future    3. Spinal cord stimulator status    - Comp Metabolic Panel; Future  - CBC WITH DIFFERENTIAL; Future  - URINALYSIS; Future  - DX-CHEST-2 VIEWS; Future    Past Medical History:  No date: Cancer (HCC)      Comment:  treated with brachytherapy prostate  No date: Low back pain  No date: Pre-diabetes  No date: Sleep apnea  Past Surgical History:  No date: APPENDECTOMY  No date: LAMINOTOMY      Comment:  fusion and stimulator  No date: SPINAL CORD STIMULATOR      Comment:  failed lead, off as of 12/2018  Smoking status: Never Smoker                                                              Smokeless tobacco: Never Used                      Comment: second hand smoke exposure  Alcohol use: Yes           0.0 - 4.2 oz/week     Standard drinks or equivalent: 0 - 7 per week     Comment: occ    Review of patient's family history indicates:  Problem: Hypertension      Relation: Brother       Age of Onset: 16   Problem: Heart Attack      Relation: Paternal Grandfather       Age of Onset: (Not Specified)       Current Outpatient Prescriptions: •  oxyCODONE immediate release (ROXICODONE) 10 MG immediate release tablet, Take 10 mg by mouth 3 times a day as needed for Moderate Pain., Disp: , Rfl: •  PENNSAID 2 % Solution, , Disp: , Rfl: •  FLECTOR 1.3 % Patch, Apply 1 Patch to skin as directed every bedtime.,  Disp: , Rfl: •  famotidine (PEPCID) 40 MG Tab, Take 40 mg by mouth 2 times a day., Disp: , Rfl: •  Testosterone Cypionate 100 MG/ML Solution, by Intramuscular route., Disp: , Rfl: •  morphine (MS IR) 30 MG tablet, Take 30 mg by mouth 3 times a day., Disp: , Rfl: •  levothyroxine (SYNTHROID) 125 MCG Tab, Take 125 mcg by mouth Every morning on an empty stomach., Disp: , Rfl: •  gabapentin (NEURONTIN) 300 MG Cap, Take 300 mg by mouth 3 times a day., Disp: , Rfl: •  busPIRone (BUSPAR) 15 MG tablet, Take 15 mg by mouth 4 times a day., Disp: , Rfl: •  MethylPREDNISolone (MEDROL DOSEPAK) 4 MG Tablet Therapy Pack, Follow package instructions, Disp: 21 Tab, Rfl: 0•  albuterol 108 (90 Base) MCG/ACT Aero Soln inhalation aerosol, Inhale 2 Puffs by mouth every 6 hours as needed for Shortness of Breath., Disp: 8.5 g, Rfl: 0•  ondansetron (ZOFRAN ODT) 8 MG TABLET DISPERSIBLE, Take 8 mg by mouth every 8 hours as needed for Nausea., Disp: , Rfl:     Patient was instructed on the use of medications, either prescriptions or OTC and informed on when the appropriate follow up time period should be. In addition, patient was also instructed that should any acute worsening occur that they should notify this clinic asap or call 911.            Review of Systems   Constitutional: Negative.  Negative for chills and fever.   HENT: Negative.  Negative for hearing loss.    Eyes: Negative.  Negative for blurred vision and double vision.   Respiratory: Negative.  Negative for cough and hemoptysis.    Cardiovascular: Negative.  Negative for chest pain and palpitations.   Gastrointestinal: Negative.  Negative for heartburn and nausea.   Genitourinary: Negative.  Negative for dysuria.   Musculoskeletal: Positive for back pain. Negative for myalgias.   Skin: Negative.  Negative for rash.   Neurological: Negative.  Negative for dizziness, tingling and headaches.   Endo/Heme/Allergies: Negative.  Does not bruise/bleed easily.   Psychiatric/Behavioral:  "Negative.  Negative for depression and suicidal ideas.   All other systems reviewed and are negative.         Objective:     /70 (BP Location: Right arm, Patient Position: Sitting, BP Cuff Size: Adult)   Pulse 79   Temp 36.9 °C (98.4 °F) (Temporal)   Resp 16   Ht 1.803 m (5' 11\")   Wt 115.7 kg (255 lb)   SpO2 92%   BMI 35.57 kg/m²      Physical Exam   Constitutional: He is oriented to person, place, and time. He appears well-developed and well-nourished. No distress.   HENT:   Head: Normocephalic and atraumatic.   Mouth/Throat: Oropharynx is clear and moist. No oropharyngeal exudate.   Eyes: Pupils are equal, round, and reactive to light.   Cardiovascular: Normal rate, regular rhythm, normal heart sounds and intact distal pulses.  Exam reveals no gallop and no friction rub.    No murmur heard.  Pulmonary/Chest: Effort normal and breath sounds normal. No respiratory distress. He has no wheezes. He has no rales. He exhibits no tenderness.   Musculoskeletal:        Arms:  Subcutaneous stimulator in place   Neurological: He is alert and oriented to person, place, and time.   Skin: He is not diaphoretic.   Psychiatric: He has a normal mood and affect. His behavior is normal. Judgment and thought content normal.   Nursing note and vitals reviewed.              Assessment/Plan:     1. History of spinal fusion    - Comp Metabolic Panel; Future  - CBC WITH DIFFERENTIAL; Future  - URINALYSIS; Future  - DX-CHEST-2 VIEWS; Future    2. Preop cardiovascular exam    - Comp Metabolic Panel; Future  - CBC WITH DIFFERENTIAL; Future  - URINALYSIS; Future  - DX-CHEST-2 VIEWS; Future    3. Spinal cord stimulator status    - Comp Metabolic Panel; Future  - CBC WITH DIFFERENTIAL; Future  - URINALYSIS; Future  - DX-CHEST-2 VIEWS; Future      "

## 2019-04-16 ENCOUNTER — HOSPITAL ENCOUNTER (OUTPATIENT)
Dept: LAB | Facility: MEDICAL CENTER | Age: 59
End: 2019-04-16
Attending: FAMILY MEDICINE
Payer: COMMERCIAL

## 2019-04-16 DIAGNOSIS — Z96.89 SPINAL CORD STIMULATOR STATUS: ICD-10-CM

## 2019-04-16 DIAGNOSIS — Z98.1 HISTORY OF SPINAL FUSION: ICD-10-CM

## 2019-04-16 DIAGNOSIS — Z01.810 PREOP CARDIOVASCULAR EXAM: ICD-10-CM

## 2019-04-16 LAB
ALBUMIN SERPL BCP-MCNC: 4.2 G/DL (ref 3.2–4.9)
ALBUMIN/GLOB SERPL: 2.3 G/DL
ALP SERPL-CCNC: 89 U/L (ref 30–99)
ALT SERPL-CCNC: 28 U/L (ref 2–50)
ANION GAP SERPL CALC-SCNC: 10 MMOL/L (ref 0–11.9)
APPEARANCE UR: CLEAR
AST SERPL-CCNC: 23 U/L (ref 12–45)
BASOPHILS # BLD AUTO: 0.9 % (ref 0–1.8)
BASOPHILS # BLD: 0.04 K/UL (ref 0–0.12)
BILIRUB SERPL-MCNC: 0.9 MG/DL (ref 0.1–1.5)
BILIRUB UR QL STRIP.AUTO: NEGATIVE
BUN SERPL-MCNC: 15 MG/DL (ref 8–22)
CALCIUM SERPL-MCNC: 8.6 MG/DL (ref 8.5–10.5)
CHLORIDE SERPL-SCNC: 106 MMOL/L (ref 96–112)
CO2 SERPL-SCNC: 25 MMOL/L (ref 20–33)
COLOR UR: YELLOW
CREAT SERPL-MCNC: 1.04 MG/DL (ref 0.5–1.4)
EOSINOPHIL # BLD AUTO: 0.24 K/UL (ref 0–0.51)
EOSINOPHIL NFR BLD: 5.3 % (ref 0–6.9)
ERYTHROCYTE [DISTWIDTH] IN BLOOD BY AUTOMATED COUNT: 44.4 FL (ref 35.9–50)
GLOBULIN SER CALC-MCNC: 1.8 G/DL (ref 1.9–3.5)
GLUCOSE SERPL-MCNC: 112 MG/DL (ref 65–99)
GLUCOSE UR STRIP.AUTO-MCNC: NEGATIVE MG/DL
HCT VFR BLD AUTO: 48.1 % (ref 42–52)
HGB BLD-MCNC: 15.8 G/DL (ref 14–18)
IMM GRANULOCYTES # BLD AUTO: 0.01 K/UL (ref 0–0.11)
IMM GRANULOCYTES NFR BLD AUTO: 0.2 % (ref 0–0.9)
KETONES UR STRIP.AUTO-MCNC: NEGATIVE MG/DL
LEUKOCYTE ESTERASE UR QL STRIP.AUTO: NEGATIVE
LYMPHOCYTES # BLD AUTO: 1.15 K/UL (ref 1–4.8)
LYMPHOCYTES NFR BLD: 25.4 % (ref 22–41)
MCH RBC QN AUTO: 31 PG (ref 27–33)
MCHC RBC AUTO-ENTMCNC: 32.8 G/DL (ref 33.7–35.3)
MCV RBC AUTO: 94.5 FL (ref 81.4–97.8)
MICRO URNS: NORMAL
MONOCYTES # BLD AUTO: 0.38 K/UL (ref 0–0.85)
MONOCYTES NFR BLD AUTO: 8.4 % (ref 0–13.4)
NEUTROPHILS # BLD AUTO: 2.71 K/UL (ref 1.82–7.42)
NEUTROPHILS NFR BLD: 59.8 % (ref 44–72)
NITRITE UR QL STRIP.AUTO: NEGATIVE
NRBC # BLD AUTO: 0 K/UL
NRBC BLD-RTO: 0 /100 WBC
PH UR STRIP.AUTO: 6.5 [PH]
PLATELET # BLD AUTO: 218 K/UL (ref 164–446)
PMV BLD AUTO: 10.8 FL (ref 9–12.9)
POTASSIUM SERPL-SCNC: 4 MMOL/L (ref 3.6–5.5)
PROT SERPL-MCNC: 6 G/DL (ref 6–8.2)
PROT UR QL STRIP: NEGATIVE MG/DL
RBC # BLD AUTO: 5.09 M/UL (ref 4.7–6.1)
RBC UR QL AUTO: NEGATIVE
SODIUM SERPL-SCNC: 141 MMOL/L (ref 135–145)
SP GR UR STRIP.AUTO: 1.02
UROBILINOGEN UR STRIP.AUTO-MCNC: 0.2 MG/DL
WBC # BLD AUTO: 4.5 K/UL (ref 4.8–10.8)

## 2019-04-16 PROCEDURE — 80053 COMPREHEN METABOLIC PANEL: CPT

## 2019-04-16 PROCEDURE — 36415 COLL VENOUS BLD VENIPUNCTURE: CPT

## 2019-04-16 PROCEDURE — 85025 COMPLETE CBC W/AUTO DIFF WBC: CPT

## 2019-04-16 PROCEDURE — 81003 URINALYSIS AUTO W/O SCOPE: CPT

## 2019-04-21 ENCOUNTER — APPOINTMENT (OUTPATIENT)
Dept: RADIOLOGY | Facility: IMAGING CENTER | Age: 59
End: 2019-04-21
Attending: FAMILY MEDICINE
Payer: COMMERCIAL

## 2019-04-21 DIAGNOSIS — Z01.810 PREOP CARDIOVASCULAR EXAM: ICD-10-CM

## 2019-04-21 DIAGNOSIS — Z96.89 SPINAL CORD STIMULATOR STATUS: ICD-10-CM

## 2019-04-21 DIAGNOSIS — Z98.1 HISTORY OF SPINAL FUSION: ICD-10-CM

## 2019-04-21 PROCEDURE — 71046 X-RAY EXAM CHEST 2 VIEWS: CPT | Mod: TC | Performed by: FAMILY MEDICINE

## 2019-04-24 ENCOUNTER — OFFICE VISIT (OUTPATIENT)
Dept: MEDICAL GROUP | Facility: PHYSICIAN GROUP | Age: 59
End: 2019-04-24
Payer: COMMERCIAL

## 2019-04-24 ENCOUNTER — HOSPITAL ENCOUNTER (OUTPATIENT)
Dept: LAB | Facility: MEDICAL CENTER | Age: 59
End: 2019-04-24
Attending: RADIOLOGY
Payer: COMMERCIAL

## 2019-04-24 ENCOUNTER — HOSPITAL ENCOUNTER (OUTPATIENT)
Dept: LAB | Facility: MEDICAL CENTER | Age: 59
End: 2019-04-24
Attending: UROLOGY
Payer: COMMERCIAL

## 2019-04-24 VITALS
WEIGHT: 253 LBS | RESPIRATION RATE: 12 BRPM | DIASTOLIC BLOOD PRESSURE: 70 MMHG | SYSTOLIC BLOOD PRESSURE: 140 MMHG | BODY MASS INDEX: 35.42 KG/M2 | HEIGHT: 71 IN | TEMPERATURE: 98.2 F | OXYGEN SATURATION: 95 % | HEART RATE: 72 BPM

## 2019-04-24 DIAGNOSIS — G89.29 CHRONIC LOW BACK PAIN WITH SCIATICA, SCIATICA LATERALITY UNSPECIFIED, UNSPECIFIED BACK PAIN LATERALITY: ICD-10-CM

## 2019-04-24 DIAGNOSIS — Z01.810 PREOP CARDIOVASCULAR EXAM: ICD-10-CM

## 2019-04-24 DIAGNOSIS — M54.40 CHRONIC LOW BACK PAIN WITH SCIATICA, SCIATICA LATERALITY UNSPECIFIED, UNSPECIFIED BACK PAIN LATERALITY: ICD-10-CM

## 2019-04-24 DIAGNOSIS — Z96.89 SPINAL CORD STIMULATOR STATUS: ICD-10-CM

## 2019-04-24 LAB
ALBUMIN SERPL BCP-MCNC: 4.5 G/DL (ref 3.2–4.9)
ALBUMIN/GLOB SERPL: 2 G/DL
ALP SERPL-CCNC: 109 U/L (ref 30–99)
ALT SERPL-CCNC: 28 U/L (ref 2–50)
ANION GAP SERPL CALC-SCNC: 5 MMOL/L (ref 0–11.9)
AST SERPL-CCNC: 22 U/L (ref 12–45)
BASOPHILS # BLD AUTO: 0.8 % (ref 0–1.8)
BASOPHILS # BLD: 0.04 K/UL (ref 0–0.12)
BILIRUB SERPL-MCNC: 0.4 MG/DL (ref 0.1–1.5)
BUN SERPL-MCNC: 14 MG/DL (ref 8–22)
CALCIUM SERPL-MCNC: 9.3 MG/DL (ref 8.5–10.5)
CHLORIDE SERPL-SCNC: 105 MMOL/L (ref 96–112)
CO2 SERPL-SCNC: 28 MMOL/L (ref 20–33)
CREAT SERPL-MCNC: 0.96 MG/DL (ref 0.5–1.4)
EOSINOPHIL # BLD AUTO: 0.27 K/UL (ref 0–0.51)
EOSINOPHIL NFR BLD: 5.3 % (ref 0–6.9)
ERYTHROCYTE [DISTWIDTH] IN BLOOD BY AUTOMATED COUNT: 45.1 FL (ref 35.9–50)
GLOBULIN SER CALC-MCNC: 2.2 G/DL (ref 1.9–3.5)
GLUCOSE SERPL-MCNC: 102 MG/DL (ref 65–99)
HCT VFR BLD AUTO: 51.1 % (ref 42–52)
HGB BLD-MCNC: 16.5 G/DL (ref 14–18)
IMM GRANULOCYTES # BLD AUTO: 0.01 K/UL (ref 0–0.11)
IMM GRANULOCYTES NFR BLD AUTO: 0.2 % (ref 0–0.9)
LYMPHOCYTES # BLD AUTO: 1.48 K/UL (ref 1–4.8)
LYMPHOCYTES NFR BLD: 29.2 % (ref 22–41)
MCH RBC QN AUTO: 30.8 PG (ref 27–33)
MCHC RBC AUTO-ENTMCNC: 32.3 G/DL (ref 33.7–35.3)
MCV RBC AUTO: 95.5 FL (ref 81.4–97.8)
MONOCYTES # BLD AUTO: 0.44 K/UL (ref 0–0.85)
MONOCYTES NFR BLD AUTO: 8.7 % (ref 0–13.4)
NEUTROPHILS # BLD AUTO: 2.82 K/UL (ref 1.82–7.42)
NEUTROPHILS NFR BLD: 55.8 % (ref 44–72)
NRBC # BLD AUTO: 0 K/UL
NRBC BLD-RTO: 0 /100 WBC
PLATELET # BLD AUTO: 271 K/UL (ref 164–446)
PMV BLD AUTO: 10.5 FL (ref 9–12.9)
POTASSIUM SERPL-SCNC: 4.4 MMOL/L (ref 3.6–5.5)
PROT SERPL-MCNC: 6.7 G/DL (ref 6–8.2)
PSA SERPL-MCNC: 0.51 NG/ML (ref 0–4)
RBC # BLD AUTO: 5.35 M/UL (ref 4.7–6.1)
SODIUM SERPL-SCNC: 138 MMOL/L (ref 135–145)
TESTOST SERPL-MCNC: 204 NG/DL (ref 175–781)
WBC # BLD AUTO: 5.1 K/UL (ref 4.8–10.8)

## 2019-04-24 PROCEDURE — 84153 ASSAY OF PSA TOTAL: CPT

## 2019-04-24 PROCEDURE — 99214 OFFICE O/P EST MOD 30 MIN: CPT | Performed by: FAMILY MEDICINE

## 2019-04-24 PROCEDURE — 36415 COLL VENOUS BLD VENIPUNCTURE: CPT

## 2019-04-24 PROCEDURE — 80053 COMPREHEN METABOLIC PANEL: CPT

## 2019-04-24 PROCEDURE — 85025 COMPLETE CBC W/AUTO DIFF WBC: CPT

## 2019-04-24 PROCEDURE — 84403 ASSAY OF TOTAL TESTOSTERONE: CPT

## 2019-04-24 ASSESSMENT — ENCOUNTER SYMPTOMS
HEARTBURN: 0
FEVER: 0
PSYCHIATRIC NEGATIVE: 1
BACK PAIN: 1
PALPITATIONS: 0
BLURRED VISION: 0
DIZZINESS: 0
CHILLS: 0
CONSTITUTIONAL NEGATIVE: 1
BRUISES/BLEEDS EASILY: 0
GASTROINTESTINAL NEGATIVE: 1
RESPIRATORY NEGATIVE: 1
HEMOPTYSIS: 0
NEUROLOGICAL NEGATIVE: 1
DOUBLE VISION: 0
COUGH: 0
HEADACHES: 0
EYES NEGATIVE: 1
NAUSEA: 0
MYALGIAS: 0
TINGLING: 0
DEPRESSION: 0
CARDIOVASCULAR NEGATIVE: 1

## 2019-04-24 NOTE — PROGRESS NOTES
Subjective:      Christofer Herrera is a 59 y.o. male who presents with Medical Clearance            1. Preop cardiovascular exam  Labs and cxr and stress test cleared   No cp nor sob  No history of anesthetic complications  - NON SPECIFIED; Cleared for spinal procedure  Dispense: 1 Each; Refill: 0    2. Chronic low back pain with sciatica, sciatica laterality unspecified, unspecified back pain laterality    - NON SPECIFIED; Cleared for spinal procedure  Dispense: 1 Each; Refill: 0    3. Spinal cord stimulator status    - NON SPECIFIED; Cleared for spinal procedure  Dispense: 1 Each; Refill: 0    Past Medical History:  No date: Cancer (HCC)      Comment:  treated with brachytherapy prostate  No date: Low back pain  No date: Pre-diabetes  No date: Sleep apnea  Past Surgical History:  No date: APPENDECTOMY  No date: LAMINOTOMY      Comment:  fusion and stimulator  No date: SPINAL CORD STIMULATOR      Comment:  failed lead, off as of 12/2018  Smoking status: Never Smoker                                                              Smokeless tobacco: Never Used                      Comment: second hand smoke exposure  Alcohol use: Yes           0.0 - 4.2 oz/week     Standard drinks or equivalent: 0 - 7 per week     Comment: occ    Review of patient's family history indicates:  Problem: Hypertension      Relation: Brother       Age of Onset: 16   Problem: Heart Attack      Relation: Paternal Grandfather       Age of Onset: (Not Specified)       Current Outpatient Prescriptions: •  NON SPECIFIED, Cleared for spinal procedure, Disp: 1 Each, Rfl: 0•  albuterol 108 (90 Base) MCG/ACT Aero Soln inhalation aerosol, Inhale 2 Puffs by mouth every 6 hours as needed for Shortness of Breath., Disp: 8.5 g, Rfl: 0•  oxyCODONE immediate release (ROXICODONE) 10 MG immediate release tablet, Take 10 mg by mouth 3 times a day as needed for Moderate Pain., Disp: , Rfl: •  PENNSAID 2 % Solution, , Disp: , Rfl: •  FLECTOR 1.3 % Patch, Apply 1  Patch to skin as directed every bedtime., Disp: , Rfl: •  famotidine (PEPCID) 40 MG Tab, Take 40 mg by mouth 2 times a day., Disp: , Rfl: •  Testosterone Cypionate 100 MG/ML Solution, by Intramuscular route., Disp: , Rfl: •  morphine (MS IR) 30 MG tablet, Take 30 mg by mouth 3 times a day., Disp: , Rfl: •  levothyroxine (SYNTHROID) 125 MCG Tab, Take 125 mcg by mouth Every morning on an empty stomach., Disp: , Rfl: •  gabapentin (NEURONTIN) 300 MG Cap, Take 300 mg by mouth 3 times a day., Disp: , Rfl: •  busPIRone (BUSPAR) 15 MG tablet, Take 15 mg by mouth 4 times a day., Disp: , Rfl: •  MethylPREDNISolone (MEDROL DOSEPAK) 4 MG Tablet Therapy Pack, Follow package instructions (Patient not taking: Reported on 4/24/2019), Disp: 21 Tab, Rfl: 0•  ondansetron (ZOFRAN ODT) 8 MG TABLET DISPERSIBLE, Take 8 mg by mouth every 8 hours as needed for Nausea., Disp: , Rfl:     Patient was instructed on the use of medications, either prescriptions or OTC and informed on when the appropriate follow up time period should be. In addition, patient was also instructed that should any acute worsening occur that they should notify this clinic asap or call 911.            Review of Systems   Constitutional: Negative.  Negative for chills and fever.   HENT: Negative.  Negative for hearing loss.    Eyes: Negative.  Negative for blurred vision and double vision.   Respiratory: Negative.  Negative for cough and hemoptysis.    Cardiovascular: Negative.  Negative for chest pain and palpitations.   Gastrointestinal: Negative.  Negative for heartburn and nausea.   Genitourinary: Negative.  Negative for dysuria.   Musculoskeletal: Positive for back pain. Negative for myalgias.   Skin: Negative.  Negative for rash.   Neurological: Negative.  Negative for dizziness, tingling and headaches.   Endo/Heme/Allergies: Negative.  Does not bruise/bleed easily.   Psychiatric/Behavioral: Negative.  Negative for depression and suicidal ideas.   All other  "systems reviewed and are negative.         Objective:     /70   Pulse 72   Temp 36.8 °C (98.2 °F)   Resp 12   Ht 1.803 m (5' 11\")   Wt 114.8 kg (253 lb)   SpO2 95%   BMI 35.29 kg/m²      Physical Exam   Constitutional: He is oriented to person, place, and time. He appears well-developed and well-nourished. No distress.   HENT:   Head: Normocephalic and atraumatic.   Mouth/Throat: Oropharynx is clear and moist. No oropharyngeal exudate.   Eyes: Pupils are equal, round, and reactive to light.   Cardiovascular: Normal rate, regular rhythm, normal heart sounds and intact distal pulses.  Exam reveals no gallop and no friction rub.    No murmur heard.  Pulmonary/Chest: Effort normal and breath sounds normal. No respiratory distress. He has no wheezes. He has no rales. He exhibits no tenderness.   Neurological: He is alert and oriented to person, place, and time.   Skin: He is not diaphoretic.   Psychiatric: He has a normal mood and affect. His behavior is normal. Judgment and thought content normal.   Nursing note and vitals reviewed.              Assessment/Plan:     1. Preop cardiovascular exam    - NON SPECIFIED; Cleared for spinal procedure  Dispense: 1 Each; Refill: 0    2. Chronic low back pain with sciatica, sciatica laterality unspecified, unspecified back pain laterality    - NON SPECIFIED; Cleared for spinal procedure  Dispense: 1 Each; Refill: 0    3. Spinal cord stimulator status    - NON SPECIFIED; Cleared for spinal procedure  Dispense: 1 Each; Refill: 0      "

## 2019-04-25 ENCOUNTER — APPOINTMENT (OUTPATIENT)
Dept: ADMISSIONS | Facility: MEDICAL CENTER | Age: 59
End: 2019-04-25
Attending: PAIN MEDICINE
Payer: COMMERCIAL

## 2019-04-25 RX ORDER — POLYETHYLENE GLYCOL 3350 17 G/17G
17 POWDER, FOR SOLUTION ORAL DAILY
COMMUNITY

## 2019-04-25 RX ORDER — TESTOSTERONE CYPIONATE 200 MG/ML
100 INJECTION, SOLUTION INTRAMUSCULAR
COMMUNITY
Start: 2019-03-25

## 2019-04-25 RX ORDER — GABAPENTIN 600 MG/1
600 TABLET ORAL 3 TIMES DAILY
COMMUNITY

## 2019-04-25 NOTE — OR NURSING
"Preadmit appointment: \" Preparing for your Procedure information\" sheet given to patient with verbal and written instructions. Patient instructed to continue prescribed medications through the day before surgery, instructed to take the following medications the day of surgery per anesthesia protocol: Buspirone, Famotidine if needed, Gabapentin, Levothyroxine, Morphine, Ondansetron if needed, Oxycodone if needed as prescribed.  Pt instructed to bring CPAP day of surgery  "

## 2019-04-30 ENCOUNTER — TELEPHONE (OUTPATIENT)
Dept: MEDICAL GROUP | Facility: PHYSICIAN GROUP | Age: 59
End: 2019-04-30

## 2019-04-30 NOTE — TELEPHONE ENCOUNTER
Patient called just to let you know Dr. Smart is refilling the buspirone for the last time and would like you to continue it moving forward.

## 2019-05-02 ENCOUNTER — APPOINTMENT (OUTPATIENT)
Dept: RADIOLOGY | Facility: MEDICAL CENTER | Age: 59
End: 2019-05-02
Attending: PAIN MEDICINE
Payer: COMMERCIAL

## 2019-05-02 ENCOUNTER — HOSPITAL ENCOUNTER (OUTPATIENT)
Facility: MEDICAL CENTER | Age: 59
End: 2019-05-02
Attending: PAIN MEDICINE | Admitting: PAIN MEDICINE
Payer: COMMERCIAL

## 2019-05-02 ENCOUNTER — ANESTHESIA (OUTPATIENT)
Dept: SURGERY | Facility: MEDICAL CENTER | Age: 59
End: 2019-05-02
Payer: COMMERCIAL

## 2019-05-02 ENCOUNTER — ANESTHESIA EVENT (OUTPATIENT)
Dept: SURGERY | Facility: MEDICAL CENTER | Age: 59
End: 2019-05-02
Payer: COMMERCIAL

## 2019-05-02 VITALS
OXYGEN SATURATION: 96 % | BODY MASS INDEX: 34.93 KG/M2 | HEART RATE: 60 BPM | DIASTOLIC BLOOD PRESSURE: 84 MMHG | SYSTOLIC BLOOD PRESSURE: 147 MMHG | RESPIRATION RATE: 16 BRPM | WEIGHT: 250.44 LBS | TEMPERATURE: 97.7 F

## 2019-05-02 PROCEDURE — 160009 HCHG ANES TIME/MIN: Performed by: PAIN MEDICINE

## 2019-05-02 PROCEDURE — A6402 STERILE GAUZE <= 16 SQ IN: HCPCS | Performed by: PAIN MEDICINE

## 2019-05-02 PROCEDURE — 502000 HCHG MISC OR IMPLANTS RC 0278: Performed by: PAIN MEDICINE

## 2019-05-02 PROCEDURE — 160036 HCHG PACU - EA ADDL 30 MINS PHASE I: Performed by: PAIN MEDICINE

## 2019-05-02 PROCEDURE — 700111 HCHG RX REV CODE 636 W/ 250 OVERRIDE (IP)

## 2019-05-02 PROCEDURE — 500448 HCHG DRESSING, TELFA 3X4: Performed by: PAIN MEDICINE

## 2019-05-02 PROCEDURE — 502240 HCHG MISC OR SUPPLY RC 0272: Performed by: PAIN MEDICINE

## 2019-05-02 PROCEDURE — C1778 LEAD, NEUROSTIMULATOR: HCPCS | Performed by: PAIN MEDICINE

## 2019-05-02 PROCEDURE — 700101 HCHG RX REV CODE 250

## 2019-05-02 PROCEDURE — 72080 X-RAY EXAM THORACOLMB 2/> VW: CPT

## 2019-05-02 PROCEDURE — 700111 HCHG RX REV CODE 636 W/ 250 OVERRIDE (IP): Performed by: ANESTHESIOLOGY

## 2019-05-02 PROCEDURE — 160046 HCHG PACU - 1ST 60 MINS PHASE II: Performed by: PAIN MEDICINE

## 2019-05-02 PROCEDURE — 160025 RECOVERY II MINUTES (STATS): Performed by: PAIN MEDICINE

## 2019-05-02 PROCEDURE — 160041 HCHG SURGERY MINUTES - EA ADDL 1 MIN LEVEL 4: Performed by: PAIN MEDICINE

## 2019-05-02 PROCEDURE — 500440 HCHG DRESSING, STERILE ROLL (KERLIX): Performed by: PAIN MEDICINE

## 2019-05-02 PROCEDURE — 700101 HCHG RX REV CODE 250: Performed by: ANESTHESIOLOGY

## 2019-05-02 PROCEDURE — C1897 LEAD, NEUROSTIM TEST KIT: HCPCS | Performed by: PAIN MEDICINE

## 2019-05-02 PROCEDURE — 160029 HCHG SURGERY MINUTES - 1ST 30 MINS LEVEL 4: Performed by: PAIN MEDICINE

## 2019-05-02 PROCEDURE — 160002 HCHG RECOVERY MINUTES (STAT): Performed by: PAIN MEDICINE

## 2019-05-02 PROCEDURE — L8699 PROSTHETIC IMPLANT NOS: HCPCS | Performed by: PAIN MEDICINE

## 2019-05-02 PROCEDURE — 160035 HCHG PACU - 1ST 60 MINS PHASE I: Performed by: PAIN MEDICINE

## 2019-05-02 PROCEDURE — 160048 HCHG OR STATISTICAL LEVEL 1-5: Performed by: PAIN MEDICINE

## 2019-05-02 PROCEDURE — 501838 HCHG SUTURE GENERAL: Performed by: PAIN MEDICINE

## 2019-05-02 DEVICE — IMPLANTABLE DEVICE: Type: IMPLANTABLE DEVICE | Site: BACK | Status: FUNCTIONAL

## 2019-05-02 RX ORDER — SODIUM CHLORIDE, SODIUM LACTATE, POTASSIUM CHLORIDE, CALCIUM CHLORIDE 600; 310; 30; 20 MG/100ML; MG/100ML; MG/100ML; MG/100ML
1000 INJECTION, SOLUTION INTRAVENOUS
Status: DISCONTINUED | OUTPATIENT
Start: 2019-05-02 | End: 2019-05-02 | Stop reason: HOSPADM

## 2019-05-02 RX ORDER — LORAZEPAM 2 MG/ML
0.5 INJECTION INTRAMUSCULAR
Status: DISCONTINUED | OUTPATIENT
Start: 2019-05-02 | End: 2019-05-02 | Stop reason: HOSPADM

## 2019-05-02 RX ORDER — HALOPERIDOL 5 MG/ML
1 INJECTION INTRAMUSCULAR
Status: DISCONTINUED | OUTPATIENT
Start: 2019-05-02 | End: 2019-05-02 | Stop reason: HOSPADM

## 2019-05-02 RX ORDER — CEFAZOLIN SODIUM 1 G/3ML
INJECTION, POWDER, FOR SOLUTION INTRAMUSCULAR; INTRAVENOUS PRN
Status: DISCONTINUED | OUTPATIENT
Start: 2019-05-02 | End: 2019-05-02 | Stop reason: SURG

## 2019-05-02 RX ORDER — ONDANSETRON 2 MG/ML
INJECTION INTRAMUSCULAR; INTRAVENOUS PRN
Status: DISCONTINUED | OUTPATIENT
Start: 2019-05-02 | End: 2019-05-02 | Stop reason: SURG

## 2019-05-02 RX ORDER — MEPERIDINE HYDROCHLORIDE 25 MG/ML
INJECTION INTRAMUSCULAR; INTRAVENOUS; SUBCUTANEOUS PRN
Status: DISCONTINUED | OUTPATIENT
Start: 2019-05-02 | End: 2019-05-02 | Stop reason: SURG

## 2019-05-02 RX ORDER — OXYCODONE HCL 5 MG/5 ML
5 SOLUTION, ORAL ORAL
Status: DISCONTINUED | OUTPATIENT
Start: 2019-05-02 | End: 2019-05-02 | Stop reason: HOSPADM

## 2019-05-02 RX ORDER — MEPERIDINE HYDROCHLORIDE 50 MG/ML
50 INJECTION INTRAMUSCULAR; INTRAVENOUS; SUBCUTANEOUS
Status: DISCONTINUED | OUTPATIENT
Start: 2019-05-02 | End: 2019-05-02 | Stop reason: HOSPADM

## 2019-05-02 RX ORDER — OXYCODONE HCL 5 MG/5 ML
10 SOLUTION, ORAL ORAL
Status: DISCONTINUED | OUTPATIENT
Start: 2019-05-02 | End: 2019-05-02 | Stop reason: HOSPADM

## 2019-05-02 RX ORDER — BUPIVACAINE HYDROCHLORIDE 2.5 MG/ML
INJECTION, SOLUTION EPIDURAL; INFILTRATION; INTRACAUDAL
Status: DISCONTINUED | OUTPATIENT
Start: 2019-05-02 | End: 2019-05-02 | Stop reason: HOSPADM

## 2019-05-02 RX ORDER — SODIUM CHLORIDE, SODIUM LACTATE, POTASSIUM CHLORIDE, CALCIUM CHLORIDE 600; 310; 30; 20 MG/100ML; MG/100ML; MG/100ML; MG/100ML
INJECTION, SOLUTION INTRAVENOUS CONTINUOUS
Status: DISCONTINUED | OUTPATIENT
Start: 2019-05-02 | End: 2019-05-02 | Stop reason: HOSPADM

## 2019-05-02 RX ORDER — DIPHENHYDRAMINE HYDROCHLORIDE 50 MG/ML
12.5 INJECTION INTRAMUSCULAR; INTRAVENOUS
Status: DISCONTINUED | OUTPATIENT
Start: 2019-05-02 | End: 2019-05-02 | Stop reason: HOSPADM

## 2019-05-02 RX ORDER — ONDANSETRON 2 MG/ML
4 INJECTION INTRAMUSCULAR; INTRAVENOUS
Status: DISCONTINUED | OUTPATIENT
Start: 2019-05-02 | End: 2019-05-02 | Stop reason: HOSPADM

## 2019-05-02 RX ORDER — BACITRACIN 65 UNIT/MG
POWDER (GRAM) MISCELLANEOUS
Status: DISCONTINUED | OUTPATIENT
Start: 2019-05-02 | End: 2019-05-02 | Stop reason: HOSPADM

## 2019-05-02 RX ADMIN — PROPOFOL 20 MG: 10 INJECTION, EMULSION INTRAVENOUS at 11:21

## 2019-05-02 RX ADMIN — PROPOFOL 50 MG: 10 INJECTION, EMULSION INTRAVENOUS at 11:16

## 2019-05-02 RX ADMIN — CEFAZOLIN 3 G: 1 INJECTION, POWDER, FOR SOLUTION INTRAVENOUS at 10:17

## 2019-05-02 RX ADMIN — PROPOFOL 150 MG: 10 INJECTION, EMULSION INTRAVENOUS at 10:21

## 2019-05-02 RX ADMIN — ONDANSETRON 4 MG: 2 INJECTION INTRAMUSCULAR; INTRAVENOUS at 11:14

## 2019-05-02 RX ADMIN — ALFENTANIL HYDROCHLORIDE 1000 MCG: 500 INJECTION, SOLUTION INTRAVENOUS at 10:18

## 2019-05-02 RX ADMIN — MEPERIDINE HYDROCHLORIDE 25 MG: 25 INJECTION INTRAMUSCULAR; INTRAVENOUS; SUBCUTANEOUS at 11:14

## 2019-05-02 RX ADMIN — SODIUM CHLORIDE, SODIUM LACTATE, POTASSIUM CHLORIDE, CALCIUM CHLORIDE 1000 ML: 600; 310; 30; 20 INJECTION, SOLUTION INTRAVENOUS at 08:18

## 2019-05-02 RX ADMIN — ROCURONIUM BROMIDE 5 MG: 10 INJECTION INTRAVENOUS at 10:18

## 2019-05-02 RX ADMIN — PROPOFOL 30 MG: 10 INJECTION, EMULSION INTRAVENOUS at 11:18

## 2019-05-02 RX ADMIN — SUCCINYLCHOLINE CHLORIDE 200 MG: 20 INJECTION, SOLUTION INTRAMUSCULAR; INTRAVENOUS at 10:21

## 2019-05-02 RX ADMIN — SODIUM CHLORIDE, SODIUM LACTATE, POTASSIUM CHLORIDE, CALCIUM CHLORIDE: 600; 310; 30; 20 INJECTION, SOLUTION INTRAVENOUS at 10:17

## 2019-05-02 RX ADMIN — MEPERIDINE HYDROCHLORIDE 25 MG: 25 INJECTION INTRAMUSCULAR; INTRAVENOUS; SUBCUTANEOUS at 10:31

## 2019-05-02 RX ADMIN — PROPOFOL 50 MG: 10 INJECTION, EMULSION INTRAVENOUS at 10:18

## 2019-05-02 NOTE — ANESTHESIA QCDR
2019 [facility] Clinical Data Registry (for Quality Improvement)     Postoperative nausea/vomiting risk protocol (Adult = 18 yrs and Pediatric 3-17 yrs)- (430 and 463)  General inhalation anesthetic (NOT TIVA) with PONV risk factors: No  Provision of anti-emetic therapy with at least 2 different classes of agents: N/A  Patient DID NOT receive anti-emetic therapy and reason is documented in Medical Record: N/A    Multimodal Pain Management- (AQI59)  Patient undergoing Elective Surgery (i.e. Outpatient, or ASC, or Prescheduled Surgery prior to Hospital Admission): Yes  Use of Multimodal Pain Management, two or more drugs and/or interventions, NOT including systemic opioids: Yes   Exception: Documented allergy to multiple classes of analgesics:  N/A    PACU assessment of acute postoperative pain prior to Anesthesia Care End- Applies to Patients Age = 18- (ABG7)  Initial PACU pain score is which of the following: < 7/10  Patient unable to report pain score: N/A    Post-anesthetic transfer of care checklist/protocol to PACU/ICU- (426 and 427)  Upon conclusion of case, patient transferred to which of the following locations: PACU/Non-ICU  Use of transfer checklist/protocol: Yes  Exclusion: Service Performed in Patient Hospital Room (and thus did not require transfer): N/A    PACU Reintubation- (AQI31)  General anesthesia requiring endotracheal intubation (ETT) along with subsequent extubation in OR or PACU: Yes  Required reintubation in the PACU: No   Extubation was a planned trial documented in the medical record prior to removal of the original airway device:  N/A    Unplanned admission to ICU related to anesthesia service up through end of PACU care- (MD51)  Unplanned admission to ICU (not initially anticipated at anesthesia start time): No

## 2019-05-02 NOTE — OR NURSING
1128 To PACU from OR via gurney, sleeping, respirations spontaneous and non-labored via OPA. VSS.  Plan to keep pt in PACU for full hour per ST[facility]ANG protocol.  1147 OPA dc'd at this time. VSS.   1150 Pt able to roll to side. Dressing to mid lower back c./d/i Pt states pain is 4/10 and tolerable. Pt states he just feels drowsy   1210 VSS. No change   1225 Pt tolerating sips of water. VSS. Pt awake alert and oriented at this time.   1232 Pt meets criteria for stage two at this time. Pt states pain is tolerable and denies nausea. Dressing to back remains c/d/i. Report to LAVON Bolanos

## 2019-05-02 NOTE — DISCHARGE INSTRUCTIONS
ACTIVITY: Rest and take it easy for the first 24 hours.  A responsible adult is recommended to remain with you during that time.  It is normal to feel sleepy.  We encourage you to not do anything that requires balance, judgment or coordination.    MILD FLU-LIKE SYMPTOMS ARE NORMAL. YOU MAY EXPERIENCE GENERALIZED MUSCLE ACHES, THROAT IRRITATION, HEADACHE AND/OR SOME NAUSEA.    FOR 24 HOURS DO NOT:  Drive, operate machinery or run household appliances.  Drink beer or alcoholic beverages.   Make important decisions or sign legal documents.    SPECIAL INSTRUCTIONS: May remove dressing in three days and shower. Do not shower before  (5/5/19)  Ice to affected area for comfort.  Begin antibiotics as prescribed. Follow up with Dr. Smart in 6-10 days     DIET: To avoid nausea, slowly advance diet as tolerated, avoiding spicy or greasy foods for the first day.  Add more substantial food to your diet according to your physician's instructions.  Babies can be fed formula or breast milk as soon as they are hungry.  INCREASE FLUIDS AND FIBER TO AVOID CONSTIPATION.        FOLLOW-UP APPOINTMENT:  A follow-up appointment should be arranged with your doctor; call to schedule.    You should CALL YOUR PHYSICIAN if you develop:  Fever greater than 101 degrees F.  Pain not relieved by medication, or persistent nausea or vomiting.  Excessive bleeding (blood soaking through dressing) or unexpected drainage from the wound.  Extreme redness or swelling around the incision site, drainage of pus or foul smelling drainage.  Inability to urinate or empty your bladder within 8 hours.  Problems with breathing or chest pain.    You should call 911 if you develop problems with breathing or chest pain.  If you are unable to contact your doctor or surgical center, you should go to the nearest emergency room or urgent care center.  Physician's telephone #: Dr. Smart [phone]    If any questions arise, call your doctor.  If your doctor is not  available, please feel free to call the Surgical Center at [phone].  The Center is open Monday through Friday from 7AM to 7PM.  You can also call the HEALTH HOTLINE open 24 hours/day, 7 days/week and speak to a nurse at [phone], or toll free at [phone].    A registered nurse may call you a few days after your surgery to see how you are doing after your procedure.    MEDICATIONS: Resume taking daily medication.  Take prescribed pain medication with food.  If no medication is prescribed, you may take non-aspirin pain medication if needed.  PAIN MEDICATION CAN BE VERY CONSTIPATING.  Take a stool softener or laxative such as senokot, pericolace, or milk of magnesia if needed.    Prescription given for Keflex.  Last pain medication given at None given in recovery     If your physician has prescribed pain medication that includes Acetaminophen (Tylenol), do not take additional Acetaminophen (Tylenol) while taking the prescribed medication.    Depression / Suicide Risk    As you are discharged from this [facility] facility, it is important to learn how to keep safe from harming yourself.    Recognize the warning signs:  · Abrupt changes in personality, positive or negative- including increase in energy   · Giving away possessions  · Change in eating patterns- significant weight changes-  positive or negative  · Change in sleeping patterns- unable to sleep or sleeping all the time   · Unwillingness or inability to communicate  · Depression  · Unusual sadness, discouragement and loneliness  · Talk of wanting to die  · Neglect of personal appearance   · Rebelliousness- reckless behavior  · Withdrawal from people/activities they love  · Confusion- inability to concentrate     If you or a loved one observes any of these behaviors or has concerns about self-harm, here's what you can do:  · Talk about it- your feelings and reasons for harming yourself  · Remove any means that you might use to hurt yourself  (examples: pills, rope, extension cords, firearm)  · Get professional help from the community (Mental Health, Substance Abuse, psychological counseling)  · Do not be alone:Call your Safe Contact- someone whom you trust who will be there for you.  · Call your local CRISIS HOTLINE 282-4663 or 232-545-2308  · Call your local Children's Mobile Crisis Response Team [place] (909) 412-8698 or www."[employer]  · Call the toll free National Suicide Prevention Hotlines   · National Suicide Prevention Lifeline 478-163-VNUF (9322)  · National Hope Line Network 800-SUICIDE (128-6480)

## 2019-05-02 NOTE — ANESTHESIA POSTPROCEDURE EVALUATION
Patient: Christofer Herrera    Procedure Summary     Date:  05/02/19 Room / Location:   OR 02 / SURGERY [facility]    Anesthesia Start:  1017 Anesthesia Stop:  1130    Procedure:  INSERTION, NEUROSTIMULATOR, PERMANENT, SPINAL CORD - LEAD REVISION (N/A Back) Diagnosis:  (CHRONIC PAIN)    Surgeon:  Cristin Smart M.D. Responsible Provider:  Harry Walton M.D.    Anesthesia Type:  general ASA Status:  3          Final Anesthesia Type: general  Last vitals  BP   Blood Pressure: 133/79    Temp   36.9 °C (98.4 °F)    Pulse   Pulse: 69   Resp   16    SpO2   91 %      Anesthesia Post Evaluation    Patient location during evaluation: PACU  Patient participation: complete - patient participated  Level of consciousness: awake and alert    Airway patency: patent  Anesthetic complications: no  Cardiovascular status: hemodynamically stable  Respiratory status: acceptable  Hydration status: euvolemic    PONV: none           Nurse Pain Score: 4 (NPRS)

## 2019-05-02 NOTE — ANESTHESIA PROCEDURE NOTES
Airway  Date/Time: 5/2/2019 10:22 AM  Performed by: EVIE DE LOS SANTOS  Authorized by: EVIE DE LOS SANTOS     Location:  OR  Urgency:  Elective  Indications for Airway Management:  Anesthesia  Spontaneous Ventilation: absent    Sedation Level:  Deep  Preoxygenated: Yes    Patient Position:  Sniffing  Final Airway Type:  Endotracheal airway  Final Endotracheal Airway:  ETT  Cuffed: Yes    Technique Used for Successful ETT Placement:  Direct laryngoscopy  Insertion Site:  Oral  Blade Type:  Jackman  Laryngoscope Blade/Videolaryngoscope Blade Size:  3  ETT Size (mm):  8.0  Measured from:  Lips  ETT to Lips (cm):  24  Placement Verified by: auscultation and capnometry    Cormack-Lehane Classification:  Grade I - full view of glottis  Number of Attempts at Approach:  1

## 2019-05-02 NOTE — ANESTHESIA TIME REPORT
Anesthesia Start and Stop Event Times     Date Time Event    5/2/2019 1017 Anesthesia Start     1130 Anesthesia Stop        Responsible Staff  05/02/19    Name Role Begin End    Harry Walton M.D. Anesth 1017 1130        Preop Diagnosis (Free Text):  Pre-op Diagnosis     CHRONIC PAIN        Preop Diagnosis (Codes):  Diagnosis Information     Diagnosis Code(s):         Post op Diagnosis  Chronic pain      Premium Reason  Non-Premium    Comments:

## 2019-05-02 NOTE — ANESTHESIA PREPROCEDURE EVALUATION
Relevant Problems   (+) STEVE on CPAP       Physical Exam    Airway   Mallampati: II  TM distance: >3 FB  Neck ROM: full       Cardiovascular - normal exam  Rhythm: regular  Rate: normal     Dental - normal exam         Pulmonary - normal exam  Breath sounds clear to auscultation     Abdominal    Neurological - normal exam                 Anesthesia Plan    ASA 3   ASA physical status 3 criteria: other (comment)    Plan - general       Airway plan will be ETT        Induction: intravenous    Postoperative Plan: Postoperative administration of opioids is intended.    Pertinent diagnostic labs and testing reviewed    Informed Consent:    Anesthetic plan and risks discussed with patient.    Use of blood products discussed with: patient whom consented to blood products.

## 2019-05-06 NOTE — OP REPORT
Surgeon:   Cristin Smart MD  Patient name:   Christofer Herrera  YOB: 1960  Date Seen:   05/02/2019    Assistants: None    Surgeon: Cristin Smart MD     Assistants: None    Preoperative diagnosis: 1. Lead Migration     Post operative diagnosis: 1.  Lead Migration    Type of Sedation:  Local Anesthesia and Conscious Sedation    Anesthesiologist:       Site:  [facility]     Procedure:  1. Removal of migrated lead  1.  Permanent insertion of new octrode contact spinla cord stimulator  lead in thoracic area    Method of Surgery:  After discussing risks, benefits, and alternatives to the procedure, the patient expressed understanding and wished to proceed.  An IV was started in the pre-op area and IV fluid administration was continued throughout the procedure.  The patient was brought into the fluoroscopy suite and placed in the prone position.  Procedural pause was conducted to verify: correct patient identity, procedure to be performed and as applicable, correct side and site, correct patient position, and availability of implants, special equipment or special instruments.  Intravenous sedation appropriate to the procedure was administered by the physician/nurse and is accurately reflected in the nurse's notes. Blood pressure, heart rate, pulse oximetry, and cardiac monitoring were monitored throughout the procedure. The patient's vital signs remained stable throughout the procedure.  EKG monitoring revealed normal sinus rhythm.  A dose of Ancef was administered intravenously prior to starting the procedure.     The skin was sterilely prepped and draped in the usual fashion using Betadine times three in the region that the procedure was to be performed.      IPG exposure:    The skin and subcutaneous tissues overlying the previous midline incision scar was anesthetized with 1% Lidocaine without epinephrine using a 25G 1.5 inch needle and a 25G 3.5 inch spinal needle for deeper tissues.  Then the 25  gauge 3.5 inch spinal needle was used to inject 0.5% bupivacaine with epinephrine for further local anesthetic control and hemostasis. A 5 cm vertical incision was made with the 10-blade scalpel along the scar of old incision.  Blunt  and sharp dissection were carried out to expose the IPG , anchors and spinal cord stimulator leads.  The migrated lead were removed from the IPG and pulled out of the pocket. The pocket was irrigated with normal saline and bacitracin.     Insertion of a Permanent Left Octrode Spinal Cord Stimulator Lead    The right T12-L1  interlaminar space was identified fluoroscopically.  The skin and subcutaneous tissues overlying the right L2 lamina were anesthetized with 1% Lidocaine without epinephrine using a 25G 1.5 inch needle and a 25G 3.5 inch spinal needle for deeper tissues.  A 14-gauge 4.5 inch Tuohy epidural needle was inserted through the skin to the right L1 lamina and was then “walked off” the superior aspect of this lamina into the T12-L1 epidural space.  The epidural space was localized using a loss of resistance technique and intermittent fluoroscopic guidance.  An octrode spinal cord stimulator lead was then advanced up the left side of the posterior epidural space to the bottom of T9 vertebra.  The 14-gauge 4.5 inch Tuohy epidural needle was then removed using a push-pull technique under direct fluoroscopic visualization to make sure the lead did not move and the end of the lead  was pulled back into the incision side through the skin. The proximal portion of the lead was anchored to the left interlaminar ligament using standard technique with interrupted 2-0 ethibond sutures and a torpedo anchor.    spinal cord stimulator lead was connected to the superior connector of the IPG.  After connection to the IPG, the system was activated confirming that the system was operational.    Implantation of the IPG    After the distal ends of the spinal cord stimulator leads were placed  into the IPG, the sterile 4x4's in the IPG pocket were removed.  The remaining length of the spinal cord stimulator leads were coiled underneath the IPG and the IPG was placed into the pocket.  A sponge count was performed and all sponges were accounted for.  2-0 Vicryl was used to close the subcutaneous tissues of the pocket and midline incision with interrupted sutures.  The skin of  incision was closed using staples.  An AP spot film was obtained to record the final lead positions.      The patient was seen in recovery and the patient received good stimulation that covered their targeted pain.  The patient was instructed in the proper use of the  and understood its use prior to discharge.  The patient tolerated the procedure well and there were no apparent complications.  After an appropriate amount of observation, the patient was dismissed from the clinic in good condition under their own power.    Complications:  None   Disposition:   1.  The patient was discharged to the recovery area in good condition.  2.  Discharge instructions were provided and explained.  3.  Patient was instructed to call with any questions or problems.  4.  Apply ice to the procedure site and keep clean and dry for 24 hours.  5.  Medications were reviewed for efficacy and appropriateness.  6.  Continue current medications.  7.  Return in 1 to 2 weeks for follow up.

## 2019-08-08 RX ORDER — BUSPIRONE HYDROCHLORIDE 15 MG/1
15 TABLET ORAL 4 TIMES DAILY
Qty: 120 TAB | Refills: 1 | Status: SHIPPED | OUTPATIENT
Start: 2019-08-08 | End: 2019-10-14 | Stop reason: SDUPTHER

## 2019-08-08 NOTE — TELEPHONE ENCOUNTER
Patient called stating he need a refill for Buspirone patient states he pain med doctor was his primary care refilling this medication from now on    Was the patient seen in the last year in this department? Yes    Does patient have an active prescription for medications requested? No     Received Request Via: Patient

## 2019-08-22 ENCOUNTER — OFFICE VISIT (OUTPATIENT)
Dept: CARDIOLOGY | Facility: MEDICAL CENTER | Age: 59
End: 2019-08-22
Payer: COMMERCIAL

## 2019-08-22 VITALS
BODY MASS INDEX: 36.22 KG/M2 | SYSTOLIC BLOOD PRESSURE: 120 MMHG | HEART RATE: 70 BPM | OXYGEN SATURATION: 91 % | HEIGHT: 70 IN | DIASTOLIC BLOOD PRESSURE: 80 MMHG | WEIGHT: 253 LBS

## 2019-08-22 DIAGNOSIS — R93.1 AGATSTON CORONARY ARTERY CALCIUM SCORE LESS THAN 100: ICD-10-CM

## 2019-08-22 DIAGNOSIS — G47.33 OSA ON CPAP: ICD-10-CM

## 2019-08-22 DIAGNOSIS — R73.03 PRE-DIABETES: ICD-10-CM

## 2019-08-22 DIAGNOSIS — E66.9 OBESITY (BMI 35.0-39.9 WITHOUT COMORBIDITY): ICD-10-CM

## 2019-08-22 PROCEDURE — 99213 OFFICE O/P EST LOW 20 MIN: CPT | Performed by: INTERNAL MEDICINE

## 2019-08-22 NOTE — PROGRESS NOTES
Cardiology Follow-up Consultation Note    Date of note:    8/22/2019   Primary Care Provider: Dirk Romo M.D.  Referring Provider: Harry Sanchez M.D.     Patient Name: Christofer Herrera   YOB: 1960  MRN:              [id number]    Chief Complaint: chest pain    History of Present Illness: Christofer Herrera is a 59 y.o. male whose current medical problems include pre-diabetes, sleep apnea, obesity who is here for follow-up.     At our initial visit, 2/15/2019:  Recently had severe left leg pain and presented to the ER for evaluation on 2/13/2019.  This was associated with 5/10 sharp stabbing left sided chest pain which resolved spontaneously after around 1-2 hours.  This usually resolves with simethicone, and he has these chest pain episodes around twice a month.     Of note, he gets severe gas and reflux with drip coffee.      BP at home is 110s/70s, does have white coat syndrome.     Exercises on elliptical machine 30 minutes.  No chest pain, occasional shortness of breath. Walks 6-8 miles a day at work.     Currently has a URI.     Of note, he does have a history of significant hypertriglyceridemia, which did improve after he stopped eating ice cream every day.     Interim Events:  No chest pain.    In terms of STEVE, remains on CPAP.     In terms of obesity, walking 8 miles a day at work.     Works 11 hours a day, so has not done aerobic exercise.       Review of Systems   Constitution: Negative for chills, fever and night sweats.   HENT: Negative for nosebleeds.    Eyes: Negative for vision loss in left eye and vision loss in right eye.   Respiratory: Negative for hemoptysis.    Gastrointestinal: Negative for hematemesis, hematochezia and melena.   Genitourinary: Negative for hematuria.   Neurological: Negative for focal weakness, numbness and paresthesias.         Past Medical History:   Diagnosis Date   • Cancer (HCC) 2015    treated with brachytherapy prostate   • Disorder of thyroid      hypothryroid   • Hernia of abdominal wall    • Indigestion     occasional   • Low back pain    • Pre-diabetes    • Sleep apnea     CPAP         Past Surgical History:   Procedure Laterality Date   • PB IMPLANT NEUROSTIM/ N/A 5/2/2019    Procedure: INSERTION, NEUROSTIMULATOR, PERMANENT, SPINAL CORD - LEAD REVISION;  Surgeon: Cristin Smart M.D.;  Location: SURGERY [facility];  Service: Pain Management   • SPINAL CORD STIMULATOR  08/20/2018    failed lead, off as of 12/2018   • LUMBAR FUSION ANTERIOR  01/21/2007   • APPENDECTOMY  1970         Current Outpatient Medications   Medication Sig Dispense Refill   • busPIRone (BUSPAR) 15 MG tablet Take 1 Tab by mouth 4 times a day. 120 Tab 1   • gabapentin (NEURONTIN) 600 MG tablet Take 600 mg by mouth 3 times a day.     • testosterone cypionate (DEPO-TESTOSTERONE) 200 MG/ML Solution injection every 14 days.     • polyethylene glycol/lytes (MIRALAX) Pack Take 17 g by mouth every day.     • B Complex Vitamins (VITAMIN B COMPLEX PO) Take  by mouth every day.     • Cholecalciferol (VITAMIN D PO) Take  by mouth every day.     • Ginkgo Biloba (GNP GINGKO BILOBA EXTRACT PO) Take  by mouth every day.     • Multiple Vitamins-Minerals (MULTIVITAMIN ADULT PO) Take  by mouth every day.     • albuterol 108 (90 Base) MCG/ACT Aero Soln inhalation aerosol Inhale 2 Puffs by mouth every 6 hours as needed for Shortness of Breath. 8.5 g 0   • oxyCODONE immediate release (ROXICODONE) 10 MG immediate release tablet Take 10 mg by mouth 4 times a day as needed for Moderate Pain.     • PENNSAID 2 % Solution as needed.     • FLECTOR 1.3 % Patch Apply 1 Patch to skin as directed every bedtime.     • famotidine (PEPCID) 40 MG Tab Take 40 mg by mouth as needed.     • morphine (MS IR) 30 MG tablet Take 30 mg by mouth 3 times a day.     • levothyroxine (SYNTHROID) 125 MCG Tab Take 125 mcg by mouth Every morning on an empty stomach.     • ondansetron (ZOFRAN ODT) 8 MG TABLET DISPERSIBLE  Take 8 mg by mouth every 8 hours as needed for Nausea.     • NON SPECIFIED Cleared for spinal procedure 1 Each 0     No current facility-administered medications for this visit.          Allergies   Allergen Reactions   • Naproxen Swelling   • Prozac [Fluoxetine] Unspecified     Patient states this would make him unemotional           Family History   Problem Relation Age of Onset   • Hypertension Brother 16   • Heart Attack Paternal Grandfather          Social History     Socioeconomic History   • Marital status:      Spouse name: Not on file   • Number of children: Not on file   • Years of education: Not on file   • Highest education level: Not on file   Occupational History   • Not on file   Social Needs   • Financial resource strain: Not on file   • Food insecurity:     Worry: Not on file     Inability: Not on file   • Transportation needs:     Medical: Not on file     Non-medical: Not on file   Tobacco Use   • Smoking status: Never Smoker   • Smokeless tobacco: Never Used   • Tobacco comment: second hand smoke exposure   Substance and Sexual Activity   • Alcohol use: Yes     Alcohol/week: 0.0 - 4.2 oz     Comment: 1-2drinks every 2-3 weeks   • Drug use: No   • Sexual activity: Yes     Partners: Female     Comment: [email or web address] in [place]   Lifestyle   • Physical activity:     Days per week: Not on file     Minutes per session: Not on file   • Stress: Not on file   Relationships   • Social connections:     Talks on phone: Not on file     Gets together: Not on file     Attends Islam service: Not on file     Active member of club or organization: Not on file     Attends meetings of clubs or organizations: Not on file     Relationship status: Not on file   • Intimate partner violence:     Fear of current or ex partner: Not on file     Emotionally abused: Not on file     Physically abused: Not on file     Forced sexual activity: Not on file   Other Topics Concern   • Not on file   Social History Narrative  "    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          Physical Exam:  Ambulatory Vitals  /80 (BP Location: Left arm, Patient Position: Sitting, BP Cuff Size: Adult)   Pulse 70   Ht 1.778 m (5' 10\")   Wt 114.8 kg (253 lb)   SpO2 91%    Oxygen Therapy:  Pulse Oximetry: 91 %  BP Readings from Last 4 Encounters:   08/22/19 120/80   05/02/19 147/84   04/24/19 140/70   04/15/19 128/70       Weight/BMI: Body mass index is 36.3 kg/m².  Wt Readings from Last 4 Encounters:   08/22/19 114.8 kg (253 lb)   05/02/19 113.6 kg (250 lb 7.1 oz)   04/24/19 114.8 kg (253 lb)   04/15/19 115.7 kg (255 lb)       General: No apparent distress  Eyes: nl conjunctiva  ENT: OP clear, normal external appearance of nose and ears  Neck: JVP <8 cm H2O  Lungs: normal respiratory effort, CTAB  Heart: RRR, no murmurs, no rubs or gallops, no edema bilateral lower extremities. No LV/RV heave on cardiac palpatation. 2+ bilateral radial pulses.    Abdomen: soft, non tender, non distended, no masses, normal bowel sounds.  No HSM.  Extremities/MSK: no clubbing, no cyanosis  Neurological: No focal sensory deficits  Psychiatric: Appropriate affect, A/O x 3, intact judgement and insight  Skin: Warm extremities    Exam repeated in full and unchanged except for as noted above.      Lab Data Review:  No results found for: CHOLSTRLTOT, LDL, HDL, TRIGLYCERIDE    Lab Results   Component Value Date/Time    SODIUM 138 04/24/2019 09:20 AM    POTASSIUM 4.4 04/24/2019 09:20 AM    CHLORIDE 105 04/24/2019 09:20 AM    CO2 28 04/24/2019 09:20 AM    GLUCOSE 102 (H) 04/24/2019 09:20 AM    BUN 14 04/24/2019 09:20 AM    CREATININE 0.96 04/24/2019 09:20 AM     Lab Results   Component Value Date/Time    ALKPHOSPHAT 109 (H) 04/24/2019 09:20 AM    ASTSGOT 22 04/24/2019 09:20 AM    ALTSGPT 28 04/24/2019 09:20 AM    TBILIRUBIN 0.4 04/24/2019 09:20 AM      Lab Results   Component Value Date/Time    WBC 5.1 04/24/2019 09:20 AM     No components found for: HBGA1C  No components found for: " TROPONIN  No components found for: BNP      Cardiac Imaging and Procedures Review:    EKG dated 2019 : My personal interpretation is NSR, normal EKG.     Nuclear stress test 2019:  Myocardial Perfusion   Report   NUCLEAR IMAGING INTERPRETATION   * No evidence of significant jeopardized viable myocardium or prior    myocardial infarction.   * Normal left ventricular size, ejection fraction, and wall motion.   * Hypertensive response to exercise.   ECG INTERPRETATION   Negative stress ECG for ischemia.      Radiology test Review:  CXR: 2019     LUNGS: Clear. No effusions.  PNEUMOTHORAX: None.  LINES AND TUBES: Partially visualized intrathecal lead at the level of the mid thoracic spine.  MEDIASTINUM: No cardiomegaly.  MUSCULOSKELETAL STRUCTURES: No acute fracture.    CCS 3/2019:    FINDINGS:    Coronary calcification:  LMA - 0.0  LCX - 0.0  LAD - 30.5  RCA - 0.0  PDA - 0.0    Total Calcium Score: 30.5    Percentile: Calcium score is above the 25th percentile for the patient's age and sex.    Ascending aorta measures 4.1 cm in diameter. Spleen measures 13 cm in length. Degenerative changes are seen in the spine. Lucent lesion with coarse trabeculations in the thoracic spine likely represents a hemangioma.      Medical Decision Makin. Sleep apnea, unspecified type  Continue CPAP    2. Obesity (BMI 35.0-39.9 without comorbidity) ([facility])  Encouraged improved dietary habits, exercise 4-5 times a week on his elliptical.     3. Pre-diabetes  Control with diet/exercise    4. Atypical chest pain.   Non-cardiac    5. Ascending aortic aneurysm - 4.1cm 3/2019 by coronary calcium score.     6. Primary prevention - CCS 30. Check lipids.       Return in about 1 year (around 2020).      Fran Diaz MD, [facility]  [facility], [address]  [address] [address]  Phone: [phone]  Fax: [phone]

## 2019-10-14 DIAGNOSIS — Z85.46 HISTORY OF PROSTATE CANCER: ICD-10-CM

## 2019-10-14 RX ORDER — BUSPIRONE HYDROCHLORIDE 15 MG/1
TABLET ORAL
Qty: 360 TAB | Refills: 1 | Status: SHIPPED | OUTPATIENT
Start: 2019-10-14 | End: 2020-04-08 | Stop reason: SDUPTHER

## 2019-10-14 NOTE — TELEPHONE ENCOUNTER
Was the patient seen in the last year in this department? Yes    Does patient have an active prescription for medications requested? Yes    Received Request Via: Pharmacy     Last visit 4/24/2019  Last labs 4/24/2019

## 2019-10-17 ENCOUNTER — HOSPITAL ENCOUNTER (OUTPATIENT)
Dept: LAB | Facility: MEDICAL CENTER | Age: 59
End: 2019-10-17
Attending: UROLOGY
Payer: COMMERCIAL

## 2019-10-17 LAB
ALBUMIN SERPL BCP-MCNC: 4.3 G/DL (ref 3.2–4.9)
ALBUMIN/GLOB SERPL: 2 G/DL
ALP SERPL-CCNC: 93 U/L (ref 30–99)
ALT SERPL-CCNC: 26 U/L (ref 2–50)
ANION GAP SERPL CALC-SCNC: 9 MMOL/L (ref 0–11.9)
AST SERPL-CCNC: 22 U/L (ref 12–45)
BASOPHILS # BLD AUTO: 1 % (ref 0–1.8)
BASOPHILS # BLD: 0.05 K/UL (ref 0–0.12)
BILIRUB SERPL-MCNC: 1.2 MG/DL (ref 0.1–1.5)
BUN SERPL-MCNC: 14 MG/DL (ref 8–22)
CALCIUM SERPL-MCNC: 9.2 MG/DL (ref 8.5–10.5)
CHLORIDE SERPL-SCNC: 109 MMOL/L (ref 96–112)
CO2 SERPL-SCNC: 24 MMOL/L (ref 20–33)
CREAT SERPL-MCNC: 1.07 MG/DL (ref 0.5–1.4)
EOSINOPHIL # BLD AUTO: 0.19 K/UL (ref 0–0.51)
EOSINOPHIL NFR BLD: 3.9 % (ref 0–6.9)
ERYTHROCYTE [DISTWIDTH] IN BLOOD BY AUTOMATED COUNT: 44.7 FL (ref 35.9–50)
GLOBULIN SER CALC-MCNC: 2.1 G/DL (ref 1.9–3.5)
GLUCOSE SERPL-MCNC: 112 MG/DL (ref 65–99)
HCT VFR BLD AUTO: 48.9 % (ref 42–52)
HGB BLD-MCNC: 15.7 G/DL (ref 14–18)
IMM GRANULOCYTES # BLD AUTO: 0.01 K/UL (ref 0–0.11)
IMM GRANULOCYTES NFR BLD AUTO: 0.2 % (ref 0–0.9)
LYMPHOCYTES # BLD AUTO: 1.09 K/UL (ref 1–4.8)
LYMPHOCYTES NFR BLD: 22.4 % (ref 22–41)
MCH RBC QN AUTO: 30.5 PG (ref 27–33)
MCHC RBC AUTO-ENTMCNC: 32.1 G/DL (ref 33.7–35.3)
MCV RBC AUTO: 95.1 FL (ref 81.4–97.8)
MONOCYTES # BLD AUTO: 0.34 K/UL (ref 0–0.85)
MONOCYTES NFR BLD AUTO: 7 % (ref 0–13.4)
NEUTROPHILS # BLD AUTO: 3.19 K/UL (ref 1.82–7.42)
NEUTROPHILS NFR BLD: 65.5 % (ref 44–72)
NRBC # BLD AUTO: 0 K/UL
NRBC BLD-RTO: 0 /100 WBC
PLATELET # BLD AUTO: 241 K/UL (ref 164–446)
PMV BLD AUTO: 10.4 FL (ref 9–12.9)
POTASSIUM SERPL-SCNC: 4.1 MMOL/L (ref 3.6–5.5)
PROT SERPL-MCNC: 6.4 G/DL (ref 6–8.2)
PSA SERPL-MCNC: 1.46 NG/ML (ref 0–4)
RBC # BLD AUTO: 5.14 M/UL (ref 4.7–6.1)
SODIUM SERPL-SCNC: 142 MMOL/L (ref 135–145)
TESTOST SERPL-MCNC: 427 NG/DL (ref 175–781)
WBC # BLD AUTO: 4.9 K/UL (ref 4.8–10.8)

## 2019-10-17 PROCEDURE — 36415 COLL VENOUS BLD VENIPUNCTURE: CPT

## 2019-10-17 PROCEDURE — 84403 ASSAY OF TOTAL TESTOSTERONE: CPT

## 2019-10-17 PROCEDURE — 80053 COMPREHEN METABOLIC PANEL: CPT

## 2019-10-17 PROCEDURE — 85025 COMPLETE CBC W/AUTO DIFF WBC: CPT

## 2019-10-17 PROCEDURE — 84153 ASSAY OF PSA TOTAL: CPT

## 2019-10-18 ENCOUNTER — PATIENT MESSAGE (OUTPATIENT)
Dept: MEDICAL GROUP | Facility: PHYSICIAN GROUP | Age: 59
End: 2019-10-18

## 2020-04-08 ENCOUNTER — OFFICE VISIT (OUTPATIENT)
Dept: MEDICAL GROUP | Facility: PHYSICIAN GROUP | Age: 60
End: 2020-04-08
Payer: COMMERCIAL

## 2020-04-08 VITALS
HEART RATE: 71 BPM | RESPIRATION RATE: 16 BRPM | HEIGHT: 71 IN | SYSTOLIC BLOOD PRESSURE: 130 MMHG | WEIGHT: 247.6 LBS | OXYGEN SATURATION: 93 % | DIASTOLIC BLOOD PRESSURE: 68 MMHG | TEMPERATURE: 98.1 F | BODY MASS INDEX: 34.66 KG/M2

## 2020-04-08 DIAGNOSIS — J20.9 ACUTE BRONCHITIS, UNSPECIFIED ORGANISM: ICD-10-CM

## 2020-04-08 DIAGNOSIS — M54.40 CHRONIC LOW BACK PAIN WITH SCIATICA, SCIATICA LATERALITY UNSPECIFIED, UNSPECIFIED BACK PAIN LATERALITY: ICD-10-CM

## 2020-04-08 DIAGNOSIS — G89.29 CHRONIC LOW BACK PAIN WITH SCIATICA, SCIATICA LATERALITY UNSPECIFIED, UNSPECIFIED BACK PAIN LATERALITY: ICD-10-CM

## 2020-04-08 PROCEDURE — 99214 OFFICE O/P EST MOD 30 MIN: CPT | Performed by: FAMILY MEDICINE

## 2020-04-08 RX ORDER — ALBUTEROL SULFATE 90 UG/1
2 AEROSOL, METERED RESPIRATORY (INHALATION) EVERY 6 HOURS PRN
Qty: 8.5 G | Refills: 0 | Status: SHIPPED | OUTPATIENT
Start: 2020-04-08 | End: 2020-10-03 | Stop reason: SDUPTHER

## 2020-04-08 RX ORDER — BUSPIRONE HYDROCHLORIDE 15 MG/1
TABLET ORAL
Qty: 360 TAB | Refills: 1 | Status: SHIPPED | OUTPATIENT
Start: 2020-04-08 | End: 2020-10-03 | Stop reason: SDUPTHER

## 2020-04-08 ASSESSMENT — ENCOUNTER SYMPTOMS
BLURRED VISION: 0
DIZZINESS: 0
HEMOPTYSIS: 0
PALPITATIONS: 0
NEUROLOGICAL NEGATIVE: 1
EYES NEGATIVE: 1
CHILLS: 0
HEARTBURN: 0
MYALGIAS: 0
DOUBLE VISION: 0
FEVER: 0
TINGLING: 0
PSYCHIATRIC NEGATIVE: 1
CONSTITUTIONAL NEGATIVE: 1
HEADACHES: 0
GASTROINTESTINAL NEGATIVE: 1
NAUSEA: 0
DEPRESSION: 0
COUGH: 0
WHEEZING: 1
BACK PAIN: 1
CARDIOVASCULAR NEGATIVE: 1
BRUISES/BLEEDS EASILY: 0

## 2020-04-08 ASSESSMENT — FIBROSIS 4 INDEX: FIB4 SCORE: 1.07

## 2020-04-08 ASSESSMENT — PATIENT HEALTH QUESTIONNAIRE - PHQ9: CLINICAL INTERPRETATION OF PHQ2 SCORE: 0

## 2020-04-08 NOTE — PROGRESS NOTES
Subjective:      Christofer Herrera is a 59 y.o. male who presents with Medication Refill (Busperone and inhaler flector)            1. Acute bronchitis, unspecified organism  The patient's current medical issue is well controlled on the current therapy with no new symptoms or worsening    - busPIRone (BUSPAR) 15 MG tablet; TAKE ONE TABLET BY MOUTH FOUR TIMES DAILY  Dispense: 360 Tab; Refill: 1  - albuterol 108 (90 Base) MCG/ACT Aero Soln inhalation aerosol; Inhale 2 Puffs by mouth every 6 hours as needed for Shortness of Breath.  Dispense: 8.5 g; Refill: 0  Patient has a diagnosis of RAD and is using their medications and trying to avoid triggers such as colds/smoke/allergens.controlled    2. Chronic low back pain with sciatica, sciatica laterality unspecified, unspecified back pain laterality  Doing well with current treatment, still seeing pain clinic for opiates  - Diclofenac Sodium (PENNSAID) 2 % Solution; Apply 1 g to affected area(s) as needed.  Dispense: 1 Bottle; Refill: 3  - FLECTOR 1.3 % Patch; Apply 1 Patch to skin as directed every bedtime.  Dispense: 30 Patch; Refill: 3    Past Medical History:  2015: Cancer (HCC)      Comment:  treated with brachytherapy prostate  No date: Disorder of thyroid      Comment:  hypothryroid  No date: Hernia of abdominal wall  No date: Indigestion      Comment:  occasional  No date: Low back pain  No date: Pre-diabetes  No date: Sleep apnea      Comment:  CPAP  Past Surgical History:  5/2/2019: PB IMPLANT NEUROSTIM/; N/A      Comment:  Procedure: INSERTION, NEUROSTIMULATOR, PERMANENT, SPINAL               CORD - LEAD REVISION;  Surgeon: Cristin Smart M.D.;                 Location: SURGERY [facility];  Service: Pain                Management  08/20/2018: SPINAL CORD STIMULATOR      Comment:  failed lead, off as of 12/2018 01/21/2007: LUMBAR FUSION ANTERIOR  1970: APPENDECTOMY  Social History    Tobacco Use      Smoking status: Never Smoker      Smokeless  tobacco: Never Used      Tobacco comment: second hand smoke exposure    Alcohol use: Yes      Alcohol/week: 0.0 - 4.2 oz      Comment: 1-2drinks every 2-3 weeks    Drug use: No    Review of patient's family history indicates:  Problem: Hypertension      Relation: Brother          Age of Onset: 16  Problem: Heart Attack      Relation: Paternal Grandfather          Age of Onset: (Not Specified)      Current Outpatient Medications: •  busPIRone (BUSPAR) 15 MG tablet, TAKE ONE TABLET BY MOUTH FOUR TIMES DAILY, Disp: 360 Tab, Rfl: 1•  albuterol 108 (90 Base) MCG/ACT Aero Soln inhalation aerosol, Inhale 2 Puffs by mouth every 6 hours as needed for Shortness of Breath., Disp: 8.5 g, Rfl: 0•  Diclofenac Sodium (PENNSAID) 2 % Solution, Apply 1 g to affected area(s) as needed., Disp: 1 Bottle, Rfl: 3•  FLECTOR 1.3 % Patch, Apply 1 Patch to skin as directed every bedtime., Disp: 30 Patch, Rfl: 3•  gabapentin (NEURONTIN) 600 MG tablet, Take 600 mg by mouth 3 times a day., Disp: , Rfl: •  testosterone cypionate (DEPO-TESTOSTERONE) 200 MG/ML Solution injection, every 14 days., Disp: , Rfl: •  polyethylene glycol/lytes (MIRALAX) Pack, Take 17 g by mouth every day., Disp: , Rfl: •  B Complex Vitamins (VITAMIN B COMPLEX PO), Take  by mouth every day., Disp: , Rfl: •  Cholecalciferol (VITAMIN D PO), Take  by mouth every day., Disp: , Rfl: •  Multiple Vitamins-Minerals (MULTIVITAMIN ADULT PO), Take  by mouth every day., Disp: , Rfl: •  oxyCODONE immediate release (ROXICODONE) 10 MG immediate release tablet, Take 10 mg by mouth 4 times a day as needed for Moderate Pain., Disp: , Rfl: •  famotidine (PEPCID) 40 MG Tab, Take 40 mg by mouth as needed., Disp: , Rfl: •  morphine (MS IR) 30 MG tablet, Take 30 mg by mouth 3 times a day., Disp: , Rfl: •  levothyroxine (SYNTHROID) 125 MCG Tab, Take 125 mcg by mouth Every morning on an empty stomach., Disp: , Rfl: •  ondansetron (ZOFRAN ODT) 8 MG TABLET DISPERSIBLE, Take 8 mg by mouth every 8  "hours as needed for Nausea., Disp: , Rfl: •  Ginkgo Biloba (GNP GINGKO BILOBA EXTRACT PO), Take  by mouth every day., Disp: , Rfl: •  NON SPECIFIED, Cleared for spinal procedure, Disp: 1 Each, Rfl: 0    Patient was instructed on the use of medications, either prescriptions or OTC and informed on when the appropriate follow up time period should be. In addition, patient was also instructed that should any acute worsening occur that they should notify this clinic asap or call 911.          Review of Systems   Constitutional: Negative.  Negative for chills and fever.   HENT: Negative.  Negative for hearing loss.    Eyes: Negative.  Negative for blurred vision and double vision.   Respiratory: Positive for wheezing. Negative for cough and hemoptysis.    Cardiovascular: Negative.  Negative for chest pain and palpitations.   Gastrointestinal: Negative.  Negative for heartburn and nausea.   Genitourinary: Negative.  Negative for dysuria.   Musculoskeletal: Positive for back pain. Negative for myalgias.   Skin: Negative.  Negative for rash.   Neurological: Negative.  Negative for dizziness, tingling and headaches.   Endo/Heme/Allergies: Negative.  Does not bruise/bleed easily.   Psychiatric/Behavioral: Negative.  Negative for depression and suicidal ideas.   All other systems reviewed and are negative.         Objective:     /68 (BP Location: Left arm, Patient Position: Sitting)   Pulse 71   Temp 36.7 °C (98.1 °F) (Tympanic)   Resp 16   Ht 1.791 m (5' 10.5\")   Wt 112.3 kg (247 lb 9.6 oz)   SpO2 93%   BMI 35.02 kg/m²      Physical Exam  Vitals signs and nursing note reviewed.   Constitutional:       General: He is not in acute distress.     Appearance: He is well-developed. He is not diaphoretic.   HENT:      Head: Normocephalic and atraumatic.      Mouth/Throat:      Pharynx: No oropharyngeal exudate.   Eyes:      Pupils: Pupils are equal, round, and reactive to light.   Cardiovascular:      Rate and Rhythm: " Normal rate and regular rhythm.      Heart sounds: Normal heart sounds. No murmur. No friction rub. No gallop.    Pulmonary:      Effort: Pulmonary effort is normal. No respiratory distress.      Breath sounds: Normal breath sounds. No wheezing or rales.   Chest:      Chest wall: No tenderness.   Neurological:      Mental Status: He is alert and oriented to person, place, and time.   Psychiatric:         Behavior: Behavior normal.         Thought Content: Thought content normal.         Judgment: Judgment normal.                 Assessment/Plan:       1. Acute bronchitis, unspecified organism    - busPIRone (BUSPAR) 15 MG tablet; TAKE ONE TABLET BY MOUTH FOUR TIMES DAILY  Dispense: 360 Tab; Refill: 1  - albuterol 108 (90 Base) MCG/ACT Aero Soln inhalation aerosol; Inhale 2 Puffs by mouth every 6 hours as needed for Shortness of Breath.  Dispense: 8.5 g; Refill: 0    2. Chronic low back pain with sciatica, sciatica laterality unspecified, unspecified back pain lateralit  - Diclofenac Sodium (PENNSAID) 2 % Solution; Apply 1 g to affected area(s) as needed.  Dispense: 1 Bottle; Refill: 3  - FLECTOR 1.3 % Patch; Apply 1 Patch to skin as directed every bedtime.  Dispense: 30 Patch; Refill: 3

## 2020-04-15 ENCOUNTER — TELEPHONE (OUTPATIENT)
Dept: MEDICAL GROUP | Facility: PHYSICIAN GROUP | Age: 60
End: 2020-04-15

## 2020-04-15 NOTE — TELEPHONE ENCOUNTER
Pt called and is requesting that we send a new referral to [facility], he said it needs to be back dated to Feb because it wasn't renewed on time. Please advise.

## 2020-05-14 ENCOUNTER — HOSPITAL ENCOUNTER (OUTPATIENT)
Dept: LAB | Facility: MEDICAL CENTER | Age: 60
End: 2020-05-14
Attending: UROLOGY
Payer: COMMERCIAL

## 2020-05-14 LAB
ALBUMIN SERPL BCP-MCNC: 4.4 G/DL (ref 3.2–4.9)
ALBUMIN/GLOB SERPL: 2.1 G/DL
ALP SERPL-CCNC: 104 U/L (ref 30–99)
ALT SERPL-CCNC: 25 U/L (ref 2–50)
ANION GAP SERPL CALC-SCNC: 10 MMOL/L (ref 7–16)
AST SERPL-CCNC: 21 U/L (ref 12–45)
BASOPHILS # BLD AUTO: 0.8 % (ref 0–1.8)
BASOPHILS # BLD: 0.04 K/UL (ref 0–0.12)
BILIRUB SERPL-MCNC: 0.5 MG/DL (ref 0.1–1.5)
BUN SERPL-MCNC: 15 MG/DL (ref 8–22)
CALCIUM SERPL-MCNC: 9.1 MG/DL (ref 8.5–10.5)
CHLORIDE SERPL-SCNC: 103 MMOL/L (ref 96–112)
CO2 SERPL-SCNC: 28 MMOL/L (ref 20–33)
CREAT SERPL-MCNC: 1.06 MG/DL (ref 0.5–1.4)
EOSINOPHIL # BLD AUTO: 0.18 K/UL (ref 0–0.51)
EOSINOPHIL NFR BLD: 3.6 % (ref 0–6.9)
ERYTHROCYTE [DISTWIDTH] IN BLOOD BY AUTOMATED COUNT: 43.7 FL (ref 35.9–50)
FASTING STATUS PATIENT QL REPORTED: NORMAL
GLOBULIN SER CALC-MCNC: 2.1 G/DL (ref 1.9–3.5)
GLUCOSE SERPL-MCNC: 97 MG/DL (ref 65–99)
HCT VFR BLD AUTO: 48 % (ref 42–52)
HGB BLD-MCNC: 15.8 G/DL (ref 14–18)
IMM GRANULOCYTES # BLD AUTO: 0.01 K/UL (ref 0–0.11)
IMM GRANULOCYTES NFR BLD AUTO: 0.2 % (ref 0–0.9)
LYMPHOCYTES # BLD AUTO: 1.32 K/UL (ref 1–4.8)
LYMPHOCYTES NFR BLD: 26.7 % (ref 22–41)
MCH RBC QN AUTO: 31.5 PG (ref 27–33)
MCHC RBC AUTO-ENTMCNC: 32.9 G/DL (ref 33.7–35.3)
MCV RBC AUTO: 95.6 FL (ref 81.4–97.8)
MONOCYTES # BLD AUTO: 0.37 K/UL (ref 0–0.85)
MONOCYTES NFR BLD AUTO: 7.5 % (ref 0–13.4)
NEUTROPHILS # BLD AUTO: 3.03 K/UL (ref 1.82–7.42)
NEUTROPHILS NFR BLD: 61.2 % (ref 44–72)
NRBC # BLD AUTO: 0 K/UL
NRBC BLD-RTO: 0 /100 WBC
PLATELET # BLD AUTO: 221 K/UL (ref 164–446)
PMV BLD AUTO: 10.9 FL (ref 9–12.9)
POTASSIUM SERPL-SCNC: 4.6 MMOL/L (ref 3.6–5.5)
PROT SERPL-MCNC: 6.5 G/DL (ref 6–8.2)
RBC # BLD AUTO: 5.02 M/UL (ref 4.7–6.1)
SODIUM SERPL-SCNC: 141 MMOL/L (ref 135–145)
WBC # BLD AUTO: 5 K/UL (ref 4.8–10.8)

## 2020-05-14 PROCEDURE — 36415 COLL VENOUS BLD VENIPUNCTURE: CPT

## 2020-05-14 PROCEDURE — 84153 ASSAY OF PSA TOTAL: CPT

## 2020-05-14 PROCEDURE — 85025 COMPLETE CBC W/AUTO DIFF WBC: CPT

## 2020-05-14 PROCEDURE — 80053 COMPREHEN METABOLIC PANEL: CPT

## 2020-05-14 PROCEDURE — 84403 ASSAY OF TOTAL TESTOSTERONE: CPT

## 2020-05-15 LAB
PSA SERPL-MCNC: 0.25 NG/ML (ref 0–4)
TESTOST SERPL-MCNC: 661 NG/DL (ref 175–781)

## 2020-08-16 ENCOUNTER — OCCUPATIONAL MEDICINE (OUTPATIENT)
Dept: URGENT CARE | Facility: CLINIC | Age: 60
End: 2020-08-16
Payer: COMMERCIAL

## 2020-08-16 ENCOUNTER — APPOINTMENT (OUTPATIENT)
Dept: URGENT CARE | Facility: CLINIC | Age: 60
End: 2020-08-16
Payer: COMMERCIAL

## 2020-08-16 VITALS
HEART RATE: 121 BPM | RESPIRATION RATE: 16 BRPM | DIASTOLIC BLOOD PRESSURE: 80 MMHG | OXYGEN SATURATION: 97 % | BODY MASS INDEX: 34.5 KG/M2 | SYSTOLIC BLOOD PRESSURE: 138 MMHG | WEIGHT: 241 LBS | TEMPERATURE: 97.8 F | HEIGHT: 70 IN

## 2020-08-16 DIAGNOSIS — S63.502A FOREARM SPRAIN, LEFT, INITIAL ENCOUNTER: ICD-10-CM

## 2020-08-16 DIAGNOSIS — S63.501A FOREARM SPRAIN, RIGHT, INITIAL ENCOUNTER: ICD-10-CM

## 2020-08-16 DIAGNOSIS — S56.912A FOREARM STRAIN, LEFT, INITIAL ENCOUNTER: ICD-10-CM

## 2020-08-16 DIAGNOSIS — S56.911A FOREARM STRAIN, RIGHT, INITIAL ENCOUNTER: ICD-10-CM

## 2020-08-16 PROCEDURE — 99214 OFFICE O/P EST MOD 30 MIN: CPT | Performed by: FAMILY MEDICINE

## 2020-08-16 RX ORDER — KETOROLAC TROMETHAMINE 30 MG/ML
60 INJECTION, SOLUTION INTRAMUSCULAR; INTRAVENOUS ONCE
Status: COMPLETED | OUTPATIENT
Start: 2020-08-16 | End: 2020-08-16

## 2020-08-16 RX ORDER — METHYLPREDNISOLONE 4 MG/1
TABLET ORAL
Qty: 21 TAB | Refills: 0 | Status: SHIPPED
Start: 2020-08-16 | End: 2020-10-13

## 2020-08-16 RX ADMIN — KETOROLAC TROMETHAMINE 60 MG: 30 INJECTION, SOLUTION INTRAMUSCULAR; INTRAVENOUS at 23:36

## 2020-08-16 ASSESSMENT — FIBROSIS 4 INDEX: FIB4 SCORE: 1.16
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
## 2020-08-17 NOTE — PROGRESS NOTES
Chief Complaint:    No chief complaint on file.      History of Present Illness:    This is a new problem.    Symptoms x days; has         Review of Systems:    Constitutional: Negative for fever, chills, and diaphoresis.   Eyes: Negative for change in vision, photophobia, pain, redness, and discharge.  ENT: Negative for ear pain, ear discharge, hearing loss, tinnitus, nasal congestion, nosebleeds, and sore throat.    Respiratory: Negative for cough, hemoptysis, sputum production, shortness of breath, wheezing, and stridor.    Cardiovascular: Negative for chest pain, palpitations, orthopnea, claudication, leg swelling, and PND.   Gastrointestinal: Negative for abdominal pain, nausea, vomiting, diarrhea, constipation, blood in stool, and melena.   Genitourinary: Negative for dysuria, urinary urgency, urinary frequency, hematuria, and flank pain.   Musculoskeletal: Negative for myalgias, joint pain, neck pain, and back pain.   Skin: Negative for rash and itching.   Neurological: Negative for dizziness, tingling, tremors, sensory change, speech change, focal weakness, seizures, loss of consciousness, and headaches.   Endo: Negative for polydipsia.   Heme: Does not bruise/bleed easily.   Psychiatric/Behavioral: Negative for depression, suicidal ideas, hallucinations, memory loss and substance abuse. The patient is not nervous/anxious and does not have insomnia.      Past Medical History:    Past Medical History:   Diagnosis Date   • Cancer (HCC) 2015    treated with brachytherapy prostate   • Disorder of thyroid     hypothryroid   • Hernia of abdominal wall    • Indigestion     occasional   • Low back pain    • Pre-diabetes    • Sleep apnea     CPAP       Past Surgical History:    Past Surgical History:   Procedure Laterality Date   • PB IMPLANT NEUROSTIM/ N/A 5/2/2019    Procedure: INSERTION, NEUROSTIMULATOR, PERMANENT, SPINAL CORD - LEAD REVISION;  Surgeon: Cristin Smart M.D.;  Location: SURGERY [facility]  ORS;  Service: Pain Management   • SPINAL CORD STIMULATOR  08/20/2018    failed lead, off as of 12/2018   • LUMBAR FUSION ANTERIOR  01/21/2007   • APPENDECTOMY  1970       Social History:    Social History     Socioeconomic History   • Marital status:      Spouse name: Not on file   • Number of children: Not on file   • Years of education: Not on file   • Highest education level: Not on file   Occupational History   • Not on file   Social Needs   • Financial resource strain: Not on file   • Food insecurity     Worry: Not on file     Inability: Not on file   • Transportation needs     Medical: Not on file     Non-medical: Not on file   Tobacco Use   • Smoking status: Never Smoker   • Smokeless tobacco: Never Used   • Tobacco comment: second hand smoke exposure   Substance and Sexual Activity   • Alcohol use: Yes     Alcohol/week: 0.0 - 4.2 oz     Comment: 1-2drinks every 2-3 weeks   • Drug use: No   • Sexual activity: Yes     Partners: Female     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 [place]   Lifestyle   • Physical activity     Days per week: Not on file     Minutes per session: Not on file   • Stress: Not on file   Relationships   • Social connections     Talks on phone: Not on file     Gets together: Not on file     Attends Yarsanism service: Not on file     Active member of club or organization: Not on file     Attends meetings of clubs or organizations: Not on file     Relationship status: Not on file   • Intimate partner violence     Fear of current or ex partner: Not on file     Emotionally abused: Not on file     Physically abused: Not on file     Forced sexual activity: Not on file   Other Topics Concern   • Not on file   Social History Narrative     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        Family History:    Family History   Problem Relation Age of Onset   • Hypertension Brother 16   • Heart Attack Paternal Grandfather        Medications:    Current Outpatient Medications on File Prior to Visit   Medication Sig  Dispense Refill   • busPIRone (BUSPAR) 15 MG tablet TAKE ONE TABLET BY MOUTH FOUR TIMES DAILY 360 Tab 1   • albuterol 108 (90 Base) MCG/ACT Aero Soln inhalation aerosol Inhale 2 Puffs by mouth every 6 hours as needed for Shortness of Breath. 8.5 g 0   • Diclofenac Sodium (PENNSAID) 2 % Solution Apply 1 g to affected area(s) as needed. 1 Bottle 3   • FLECTOR 1.3 % Patch Apply 1 Patch to skin as directed every bedtime. 30 Patch 3   • gabapentin (NEURONTIN) 600 MG tablet Take 600 mg by mouth 3 times a day.     • testosterone cypionate (DEPO-TESTOSTERONE) 200 MG/ML Solution injection every 14 days.     • polyethylene glycol/lytes (MIRALAX) Pack Take 17 g by mouth every day.     • B Complex Vitamins (VITAMIN B COMPLEX PO) Take  by mouth every day.     • Cholecalciferol (VITAMIN D PO) Take  by mouth every day.     • Ginkgo Biloba (GNP GINGKO BILOBA EXTRACT PO) Take  by mouth every day.     • Multiple Vitamins-Minerals (MULTIVITAMIN ADULT PO) Take  by mouth every day.     • NON SPECIFIED Cleared for spinal procedure 1 Each 0   • oxyCODONE immediate release (ROXICODONE) 10 MG immediate release tablet Take 10 mg by mouth 4 times a day as needed for Moderate Pain.     • famotidine (PEPCID) 40 MG Tab Take 40 mg by mouth as needed.     • morphine (MS IR) 30 MG tablet Take 30 mg by mouth 3 times a day.     • levothyroxine (SYNTHROID) 125 MCG Tab Take 125 mcg by mouth Every morning on an empty stomach.     • ondansetron (ZOFRAN ODT) 8 MG TABLET DISPERSIBLE Take 8 mg by mouth every 8 hours as needed for Nausea.       No current facility-administered medications on file prior to visit.        Allergies:    Allergies   Allergen Reactions   • Naproxen Swelling   • Prozac [Fluoxetine] Unspecified     Patient states this would make him unemotional           Vitals:    There were no vitals filed for this visit.    Physical Exam:    Constitutional: Vital signs reviewed. Appears well-developed and well-nourished. No acute distress.    Eyes: Sclera white, conjunctivae clear. PERRLA.  ENT: External ears normal. External auditory canals normal without discharge. TMs translucent and non-bulging. Hearing normal. Nasal mucosa pink. Lips/teeth are normal. Oral mucosa pink and moist. Posterior pharynx: WNL.  Neck: Neck supple.   Cardiovascular: Regular rate and rhythm. No murmur.  Pulmonary/Chest: Respirations non-labored. Clear to auscultation bilaterally.  Abdomen: Bowel sounds are normal active. Soft, non-distended, and non-tender to palpation.  Lymph: Cervical nodes without tenderness or enlargement.  Musculoskeletal: Normal gait. Normal range of motion. No tenderness to palpation. No muscular atrophy or weakness.  Neurological: Alert and oriented to person, place, and time. CN 2-12 intact. Muscle tone normal. Coordination normal.   Skin: No rashes or lesions. Warm, dry, normal turgor.  Psychiatric: Normal mood and affect. Behavior is normal. Judgment and thought content normal.     Diagnostics:      Assessment / Plan:        Discussed with him DDX, management options, and risks, benefits, and alternatives to treatment plan agreed upon.    Agreeable to medication prescribed.    Discussed expected course of duration, time for improvement, and to seek follow-up in Emergency Room, urgent care, or with PCP if getting worse at any time or not improving within expected time frame.

## 2020-08-17 NOTE — PROGRESS NOTES
Chief Complaint:    Chief Complaint   Patient presents with   • Arm Pain     Pt injured himself in May of 2020.  Today he comes in with pain in his forarms       History of Present Illness:    DOI: 5/1/2020. He was moving a [employer] umbrella, the wind started blowing, it took him up, he landed on elbows and knees and umbrella collapsed. He had whole body pain as a result of this, but over time, everything has improved back to baseline except his elbows and forearms. Since the injury, he was prescribed a steroid richard by Orthopedic Physician for his back which helped some. He has pain medication to use for chronic back pain but none are anti-inflammatories except topical Pennsaid solution. Today while at work, he lifted a box which caused worse pain in forearms and some burning. He got a headache later and feels the headache was due to the pain in his arms. He took Ibuprofen for the headache. His job is not typically physical and he does not require work restrictions.      Review of Systems:    Constitutional: Negative for fever, chills, and diaphoresis.   Eyes: Negative for change in vision, photophobia, pain, redness, and discharge.  ENT: Negative for ear pain, ear discharge, hearing loss, tinnitus, nasal congestion, nosebleeds, and sore throat.    Respiratory: Negative for cough, hemoptysis, sputum production, shortness of breath, wheezing, and stridor.    Cardiovascular: Negative for chest pain, palpitations, orthopnea, claudication, leg swelling, and PND.   Gastrointestinal: Negative for abdominal pain, nausea, vomiting, diarrhea, constipation, blood in stool, and melena.   Genitourinary: Negative for dysuria, urinary urgency, urinary frequency, hematuria, and flank pain.   Musculoskeletal: See HPI.  Skin: Negative for rash and itching.   Neurological: See HPI.  Endo: Negative for polydipsia.   Heme: Does not bruise/bleed easily.   Psychiatric/Behavioral: Negative for depression, suicidal ideas, hallucinations,  memory loss and substance abuse. The patient is not nervous/anxious and does not have insomnia.        Past Medical History:    Past Medical History:   Diagnosis Date   • Cancer (HCC) 2015    treated with brachytherapy prostate   • Disorder of thyroid     hypothryroid   • Hernia of abdominal wall    • Indigestion     occasional   • Low back pain    • Pre-diabetes    • Sleep apnea     CPAP     Past Surgical History:    Past Surgical History:   Procedure Laterality Date   • PB IMPLANT NEUROSTIM/ N/A 5/2/2019    Procedure: INSERTION, NEUROSTIMULATOR, PERMANENT, SPINAL CORD - LEAD REVISION;  Surgeon: Cristin Smart M.D.;  Location: SURGERY [facility];  Service: Pain Management   • SPINAL CORD STIMULATOR  08/20/2018    failed lead, off as of 12/2018   • LUMBAR FUSION ANTERIOR  01/21/2007   • APPENDECTOMY  1970     Social History:    Social History     Socioeconomic History   • Marital status:      Spouse name: Not on file   • Number of children: Not on file   • Years of education: Not on file   • Highest education level: Not on file   Occupational History   • Not on file   Social Needs   • Financial resource strain: Not on file   • Food insecurity     Worry: Not on file     Inability: Not on file   • Transportation needs     Medical: Not on file     Non-medical: Not on file   Tobacco Use   • Smoking status: Never Smoker   • Smokeless tobacco: Never Used   • Tobacco comment: second hand smoke exposure   Substance and Sexual Activity   • Alcohol use: Yes     Alcohol/week: 0.0 - 4.2 oz     Comment: 1-2drinks every 2-3 weeks   • Drug use: No   • Sexual activity: Yes     Partners: Female     Comment:  [employer] in [place]   Lifestyle   • Physical activity     Days per week: Not on file     Minutes per session: Not on file   • Stress: Not on file   Relationships   • Social connections     Talks on phone: Not on file     Gets together: Not on file     Attends Holiness service: Not on file     Active  member of club or organization: Not on file     Attends meetings of clubs or organizations: Not on file     Relationship status: Not on file   • Intimate partner violence     Fear of current or ex partner: Not on file     Emotionally abused: Not on file     Physically abused: Not on file     Forced sexual activity: Not on file   Other Topics Concern   • Not on file   Social History Narrative     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      Family History:    Family History   Problem Relation Age of Onset   • Hypertension Brother 16   • Heart Attack Paternal Grandfather      Medications:    Current Outpatient Medications on File Prior to Visit   Medication Sig Dispense Refill   • busPIRone (BUSPAR) 15 MG tablet TAKE ONE TABLET BY MOUTH FOUR TIMES DAILY 360 Tab 1   • albuterol 108 (90 Base) MCG/ACT Aero Soln inhalation aerosol Inhale 2 Puffs by mouth every 6 hours as needed for Shortness of Breath. 8.5 g 0   • Diclofenac Sodium (PENNSAID) 2 % Solution Apply 1 g to affected area(s) as needed. 1 Bottle 3   • FLECTOR 1.3 % Patch Apply 1 Patch to skin as directed every bedtime. 30 Patch 3   • gabapentin (NEURONTIN) 600 MG tablet Take 600 mg by mouth 3 times a day.     • testosterone cypionate (DEPO-TESTOSTERONE) 200 MG/ML Solution injection every 14 days.     • polyethylene glycol/lytes (MIRALAX) Pack Take 17 g by mouth every day.     • B Complex Vitamins (VITAMIN B COMPLEX PO) Take  by mouth every day.     • Cholecalciferol (VITAMIN D PO) Take  by mouth every day.     • Ginkgo Biloba (GNP GINGKO BILOBA EXTRACT PO) Take  by mouth every day.     • Multiple Vitamins-Minerals (MULTIVITAMIN ADULT PO) Take  by mouth every day.     • NON SPECIFIED Cleared for spinal procedure 1 Each 0   • oxyCODONE immediate release (ROXICODONE) 10 MG immediate release tablet Take 10 mg by mouth 4 times a day as needed for Moderate Pain.     • famotidine (PEPCID) 40 MG Tab Take 40 mg by mouth as needed.     • morphine (MS IR) 30 MG tablet Take 30 mg by  "mouth 3 times a day.     • levothyroxine (SYNTHROID) 125 MCG Tab Take 125 mcg by mouth Every morning on an empty stomach.     • ondansetron (ZOFRAN ODT) 8 MG TABLET DISPERSIBLE Take 8 mg by mouth every 8 hours as needed for Nausea.       No current facility-administered medications on file prior to visit.      Allergies:    Allergies   Allergen Reactions   • Naproxen Swelling   • Prozac [Fluoxetine] Unspecified     Patient states this would make him unemotional         Vitals:    Vitals:    08/16/20 2306   BP: 138/80   Pulse: (!) 121   Resp: 16   Temp: 36.6 °C (97.8 °F)   TempSrc: Temporal   SpO2: 97%   Weight: 109.3 kg (241 lb)   Height: 1.778 m (5' 10\")       Physical Exam:    Constitutional: Vital signs reviewed. Appears well-developed and well-nourished. No acute distress.   Eyes: Sclera white, conjunctivae clear.  ENT: External ears normal. Hearing normal.  Cardiovascular: Peripheral pulses 2+. No edema.  Pulmonary/Chest: Respirations non-labored.  Musculoskeletal: Normal range of motion of elbows, wrists, and hands without significant tenderness to palpation. No muscular atrophy or weakness.  Neurological: Alert and oriented to person, place, and time. Muscle tone normal. Coordination normal. Light touch and sensation normal.   Skin: No rashes or lesions. Warm, dry, normal turgor.  Psychiatric: Normal mood and affect. Behavior is normal. Judgment and thought content normal.       Assessment / Plan:    1. Forearm sprain, left, initial encounte  - ketorolac (TORADOL) injection 60 mg  - methylPREDNISolone (MEDROL DOSEPAK) 4 MG Tablet Therapy Pack; TAKE AS DIRECTED ON PACKAGE.  Dispense: 21 Tab; Refill: 0    2. Forearm sprain, right, initial encounter  - ketorolac (TORADOL) injection 60 mg  - methylPREDNISolone (MEDROL DOSEPAK) 4 MG Tablet Therapy Pack; TAKE AS DIRECTED ON PACKAGE.  Dispense: 21 Tab; Refill: 0    3. Forearm strain, left, initial encounter  - ketorolac (TORADOL) injection 60 mg  - " methylPREDNISolone (MEDROL DOSEPAK) 4 MG Tablet Therapy Pack; TAKE AS DIRECTED ON PACKAGE.  Dispense: 21 Tab; Refill: 0    4. Forearm strain, right, initial encounter  - ketorolac (TORADOL) injection 60 mg  - methylPREDNISolone (MEDROL DOSEPAK) 4 MG Tablet Therapy Pack; TAKE AS DIRECTED ON PACKAGE.  Dispense: 21 Tab; Refill: 0      Discussed with him DDX, management options, and risks, benefits, and alternatives to treatment plan agreed upon.    Full duty.    Likely inflammation as cause of symptoms.    Due to duration of symptoms offered potent anti-inflammatory treatment with Toradol (Ketorolac) IM and Medrol Dose Adryan. He would like and these were given and prescribed.    Advised he could also apply the topical Pennsaid solution he already has to the forearms if needed for pain for anti-inflammatory effect.    He will only return if needed.

## 2020-09-16 ENCOUNTER — OFFICE VISIT (OUTPATIENT)
Dept: MEDICAL GROUP | Facility: PHYSICIAN GROUP | Age: 60
End: 2020-09-16
Payer: COMMERCIAL

## 2020-09-16 VITALS
SYSTOLIC BLOOD PRESSURE: 128 MMHG | WEIGHT: 243.5 LBS | BODY MASS INDEX: 34.86 KG/M2 | TEMPERATURE: 97.9 F | HEIGHT: 70 IN | HEART RATE: 73 BPM | RESPIRATION RATE: 20 BRPM | DIASTOLIC BLOOD PRESSURE: 80 MMHG | OXYGEN SATURATION: 95 %

## 2020-09-16 DIAGNOSIS — E66.9 OBESITY (BMI 35.0-39.9 WITHOUT COMORBIDITY): ICD-10-CM

## 2020-09-16 DIAGNOSIS — Z23 NEED FOR VACCINATION: ICD-10-CM

## 2020-09-16 DIAGNOSIS — G47.30 SLEEP APNEA, UNSPECIFIED TYPE: ICD-10-CM

## 2020-09-16 DIAGNOSIS — E03.4 HYPOTHYROIDISM DUE TO ACQUIRED ATROPHY OF THYROID: ICD-10-CM

## 2020-09-16 DIAGNOSIS — G89.29 CHRONIC LOW BACK PAIN WITH SCIATICA, SCIATICA LATERALITY UNSPECIFIED, UNSPECIFIED BACK PAIN LATERALITY: ICD-10-CM

## 2020-09-16 DIAGNOSIS — B35.4 TINEA CORPORIS: ICD-10-CM

## 2020-09-16 DIAGNOSIS — M54.40 CHRONIC LOW BACK PAIN WITH SCIATICA, SCIATICA LATERALITY UNSPECIFIED, UNSPECIFIED BACK PAIN LATERALITY: ICD-10-CM

## 2020-09-16 PROCEDURE — 90686 IIV4 VACC NO PRSV 0.5 ML IM: CPT | Performed by: FAMILY MEDICINE

## 2020-09-16 PROCEDURE — 90750 HZV VACC RECOMBINANT IM: CPT | Performed by: FAMILY MEDICINE

## 2020-09-16 PROCEDURE — 90472 IMMUNIZATION ADMIN EACH ADD: CPT | Performed by: FAMILY MEDICINE

## 2020-09-16 PROCEDURE — 90471 IMMUNIZATION ADMIN: CPT | Performed by: FAMILY MEDICINE

## 2020-09-16 PROCEDURE — 99214 OFFICE O/P EST MOD 30 MIN: CPT | Performed by: FAMILY MEDICINE

## 2020-09-16 RX ORDER — LEVOTHYROXINE SODIUM 0.12 MG/1
125 TABLET ORAL
Qty: 90 TAB | Refills: 3 | Status: SHIPPED | OUTPATIENT
Start: 2020-09-16 | End: 2021-09-15 | Stop reason: SDUPTHER

## 2020-09-16 RX ORDER — CLOTRIMAZOLE AND BETAMETHASONE DIPROPIONATE 10; .64 MG/G; MG/G
1 CREAM TOPICAL 2 TIMES DAILY
Qty: 45 G | Refills: 3 | Status: SHIPPED | OUTPATIENT
Start: 2020-09-16 | End: 2021-09-28

## 2020-09-16 SDOH — HEALTH STABILITY: MENTAL HEALTH: HOW OFTEN DO YOU HAVE A DRINK CONTAINING ALCOHOL?: 2-4 TIMES A MONTH

## 2020-09-16 ASSESSMENT — ENCOUNTER SYMPTOMS
CONSTITUTIONAL NEGATIVE: 1
DEPRESSION: 0
NAUSEA: 0
HEADACHES: 0
COUGH: 0
NEUROLOGICAL NEGATIVE: 1
RESPIRATORY NEGATIVE: 1
DOUBLE VISION: 0
PSYCHIATRIC NEGATIVE: 1
CHILLS: 0
PALPITATIONS: 0
BRUISES/BLEEDS EASILY: 0
GASTROINTESTINAL NEGATIVE: 1
CARDIOVASCULAR NEGATIVE: 1
FEVER: 0
EYES NEGATIVE: 1
TINGLING: 0
DIZZINESS: 0
MYALGIAS: 0
HEMOPTYSIS: 0
MUSCULOSKELETAL NEGATIVE: 1
BLURRED VISION: 0
HEARTBURN: 0

## 2020-09-16 ASSESSMENT — FIBROSIS 4 INDEX: FIB4 SCORE: 1.16

## 2020-09-17 NOTE — PROGRESS NOTES
Subjective:      Christofer Herrera is a 60 y.o. male who presents with Other (Med refill; fungus on left ear )            1. Hypothyroidism due to acquired atrophy of thyroid  Patient is being treated for hypothyroidism, currently taking meds, no symptoms of fast or slow heartbeat and energy level steady. No new hair loss or skin symptoms. Labs have been checked in past year and are stable on current dose.  controlled      2. Tinea corporis  Rash on back of left ear where he wears an earpiece for his phone  - clotrimazole-betamethasone (LOTRISONE) 1-0.05 % Cream; Apply 1 g to affected area(s) 2 times a day.  Dispense: 45 g; Refill: 3    3. Obesity (BMI 35.0-39.9 without comorbidity) (HCC)      4. Need for vaccination    - Influenza Vaccine Quad Injection (PF)  - Shingles Vaccine (Shingrix)    Past Medical History:  2015: Cancer (HCC)      Comment:  treated with brachytherapy prostate  No date: Disorder of thyroid      Comment:  hypothryroid  No date: Hernia of abdominal wall  No date: Indigestion      Comment:  occasional  No date: Low back pain  No date: Pre-diabetes  No date: Sleep apnea      Comment:  CPAP  Past Surgical History:  5/2/2019: PB IMPLANT NEUROSTIM/; N/A      Comment:  Procedure: INSERTION, NEUROSTIMULATOR, PERMANENT, SPINAL               CORD - LEAD REVISION;  Surgeon: Cristin Smart M.D.;                 Location: SURGERY [facility];  Service: Pain                Management  08/20/2018: SPINAL CORD STIMULATOR      Comment:  failed lead, off as of 12/2018 01/21/2007: LUMBAR FUSION ANTERIOR  1970: APPENDECTOMY  Social History    Tobacco Use      Smoking status: Never Smoker      Smokeless tobacco: Never Used      Tobacco comment: second hand smoke exposure    Alcohol use: Yes      Alcohol/week: 0.0 - 4.2 oz      Frequency: 2-4 times a month      Comment: 1-2drinks every 2-3 weeks    Drug use: No    Review of patient's family history indicates:  Problem: Hypertension      Relation:  Brother          Age of Onset: 16  Problem: Heart Attack      Relation: Paternal Grandfather          Age of Onset: (Not Specified)      Current Outpatient Medications: •  levothyroxine (SYNTHROID) 125 MCG Tab, Take 1 Tab by mouth Every morning on an empty stomach., Disp: 90 Tab, Rfl: 3•  clotrimazole-betamethasone (LOTRISONE) 1-0.05 % Cream, Apply 1 g to affected area(s) 2 times a day., Disp: 45 g, Rfl: 3•  busPIRone (BUSPAR) 15 MG tablet, TAKE ONE TABLET BY MOUTH FOUR TIMES DAILY, Disp: 360 Tab, Rfl: 1•  albuterol 108 (90 Base) MCG/ACT Aero Soln inhalation aerosol, Inhale 2 Puffs by mouth every 6 hours as needed for Shortness of Breath., Disp: 8.5 g, Rfl: 0•  Diclofenac Sodium (PENNSAID) 2 % Solution, Apply 1 g to affected area(s) as needed., Disp: 1 Bottle, Rfl: 3•  FLECTOR 1.3 % Patch, Apply 1 Patch to skin as directed every bedtime., Disp: 30 Patch, Rfl: 3•  gabapentin (NEURONTIN) 600 MG tablet, Take 600 mg by mouth 3 times a day., Disp: , Rfl: •  testosterone cypionate (DEPO-TESTOSTERONE) 200 MG/ML Solution injection, every 14 days., Disp: , Rfl: •  polyethylene glycol/lytes (MIRALAX) Pack, Take 17 g by mouth every day., Disp: , Rfl: •  B Complex Vitamins (VITAMIN B COMPLEX PO), Take  by mouth every day., Disp: , Rfl: •  Cholecalciferol (VITAMIN D PO), Take  by mouth every day., Disp: , Rfl: •  Ginkgo Biloba (GNP GINGKO BILOBA EXTRACT PO), Take  by mouth every day., Disp: , Rfl: •  Multiple Vitamins-Minerals (MULTIVITAMIN ADULT PO), Take  by mouth every day., Disp: , Rfl: •  NON SPECIFIED, Cleared for spinal procedure, Disp: 1 Each, Rfl: 0•  oxyCODONE immediate release (ROXICODONE) 10 MG immediate release tablet, Take 10 mg by mouth 4 times a day as needed for Moderate Pain., Disp: , Rfl: •  famotidine (PEPCID) 40 MG Tab, Take 40 mg by mouth as needed., Disp: , Rfl: •  morphine (MS IR) 30 MG tablet, Take 30 mg by mouth 3 times a day., Disp: , Rfl: •  ondansetron (ZOFRAN ODT) 8 MG TABLET DISPERSIBLE, Take 8 mg  "by mouth every 8 hours as needed for Nausea., Disp: , Rfl: •  methylPREDNISolone (MEDROL DOSEPAK) 4 MG Tablet Therapy Pack, TAKE AS DIRECTED ON PACKAGE. (Patient not taking: Reported on 9/16/2020), Disp: 21 Tab, Rfl: 0    Patient was instructed on the use of medications, either prescriptions or OTC and informed on when the appropriate follow up time period should be. In addition, patient was also instructed that should any acute worsening occur that they should notify this clinic asap or call 911.          Review of Systems   Constitutional: Negative.  Negative for chills and fever.   HENT: Negative.  Negative for hearing loss.    Eyes: Negative.  Negative for blurred vision and double vision.   Respiratory: Negative.  Negative for cough and hemoptysis.    Cardiovascular: Negative.  Negative for chest pain and palpitations.   Gastrointestinal: Negative.  Negative for heartburn and nausea.   Genitourinary: Negative.  Negative for dysuria.   Musculoskeletal: Negative.  Negative for myalgias.   Skin: Positive for itching and rash.   Neurological: Negative.  Negative for dizziness, tingling and headaches.   Endo/Heme/Allergies: Negative.  Does not bruise/bleed easily.   Psychiatric/Behavioral: Negative.  Negative for depression and suicidal ideas.   All other systems reviewed and are negative.         Objective:     /80 (BP Location: Left arm, Patient Position: Sitting, BP Cuff Size: Adult)   Pulse 73   Temp 36.6 °C (97.9 °F) (Temporal)   Resp 20   Ht 1.778 m (5' 10\")   Wt 110.5 kg (243 lb 8 oz)   SpO2 95%   BMI 34.94 kg/m²      Physical Exam  Vitals signs and nursing note reviewed.   Constitutional:       General: He is not in acute distress.     Appearance: He is well-developed. He is not diaphoretic.   HENT:      Head: Normocephalic and atraumatic.        Mouth/Throat:      Pharynx: No oropharyngeal exudate.   Eyes:      Pupils: Pupils are equal, round, and reactive to light.   Cardiovascular:      Rate " and Rhythm: Normal rate and regular rhythm.      Heart sounds: Normal heart sounds. No murmur. No friction rub. No gallop.    Pulmonary:      Effort: Pulmonary effort is normal. No respiratory distress.      Breath sounds: Normal breath sounds. No wheezing or rales.   Chest:      Chest wall: No tenderness.   Neurological:      Mental Status: He is alert and oriented to person, place, and time.   Psychiatric:         Behavior: Behavior normal.         Thought Content: Thought content normal.         Judgment: Judgment normal.                 Assessment/Plan:        1. Hypothyroidism due to acquired atrophy of thyroid      2. Tinea corporis    - clotrimazole-betamethasone (LOTRISONE) 1-0.05 % Cream; Apply 1 g to affected area(s) 2 times a day.  Dispense: 45 g; Refill: 3    3. Obesity (BMI 35.0-39.9 without comorbidity) (HCC)      4. Need for vaccination    - Influenza Vaccine Quad Injection (PF)  - Shingles Vaccine (Shingrix)

## 2020-09-18 ENCOUNTER — TELEPHONE (OUTPATIENT)
Dept: MEDICAL GROUP | Facility: PHYSICIAN GROUP | Age: 60
End: 2020-09-18

## 2020-09-18 NOTE — TELEPHONE ENCOUNTER
Pt called requesting a referral to Dr. Roman at [facility] to continue seeing him for his sleep apnea. Pt has an appt in October.

## 2020-09-23 NOTE — TELEPHONE ENCOUNTER
Pt left voicemail checking on status of the referral.   Unable to reach pt as mailbox was full to advise that referral has been ordered and is being processed by the referral department.

## 2020-10-03 DIAGNOSIS — G89.29 CHRONIC LOW BACK PAIN WITH SCIATICA, SCIATICA LATERALITY UNSPECIFIED, UNSPECIFIED BACK PAIN LATERALITY: ICD-10-CM

## 2020-10-03 DIAGNOSIS — J20.9 ACUTE BRONCHITIS, UNSPECIFIED ORGANISM: ICD-10-CM

## 2020-10-03 DIAGNOSIS — M54.40 CHRONIC LOW BACK PAIN WITH SCIATICA, SCIATICA LATERALITY UNSPECIFIED, UNSPECIFIED BACK PAIN LATERALITY: ICD-10-CM

## 2020-10-05 RX ORDER — BUSPIRONE HYDROCHLORIDE 15 MG/1
TABLET ORAL
Qty: 360 TAB | Refills: 1 | Status: SHIPPED | OUTPATIENT
Start: 2020-10-05 | End: 2021-05-14 | Stop reason: SDUPTHER

## 2020-10-05 RX ORDER — ALBUTEROL SULFATE 90 UG/1
2 AEROSOL, METERED RESPIRATORY (INHALATION) EVERY 6 HOURS PRN
Qty: 8.5 G | Refills: 0 | Status: SHIPPED | OUTPATIENT
Start: 2020-10-05 | End: 2022-02-14

## 2020-10-13 ENCOUNTER — OFFICE VISIT (OUTPATIENT)
Dept: CARDIOLOGY | Facility: MEDICAL CENTER | Age: 60
End: 2020-10-13
Payer: COMMERCIAL

## 2020-10-13 VITALS
HEART RATE: 72 BPM | WEIGHT: 249.8 LBS | DIASTOLIC BLOOD PRESSURE: 78 MMHG | OXYGEN SATURATION: 94 % | SYSTOLIC BLOOD PRESSURE: 132 MMHG | HEIGHT: 70 IN | BODY MASS INDEX: 35.76 KG/M2

## 2020-10-13 DIAGNOSIS — R93.1 AGATSTON CORONARY ARTERY CALCIUM SCORE LESS THAN 100: ICD-10-CM

## 2020-10-13 DIAGNOSIS — R73.03 PRE-DIABETES: ICD-10-CM

## 2020-10-13 DIAGNOSIS — R73.01 IMPAIRED FASTING GLUCOSE: ICD-10-CM

## 2020-10-13 DIAGNOSIS — E66.9 OBESITY (BMI 35.0-39.9 WITHOUT COMORBIDITY): ICD-10-CM

## 2020-10-13 DIAGNOSIS — G47.33 OSA ON CPAP: ICD-10-CM

## 2020-10-13 PROCEDURE — 99213 OFFICE O/P EST LOW 20 MIN: CPT | Performed by: INTERNAL MEDICINE

## 2020-10-13 RX ORDER — MORPHINE SULFATE 30 MG/1
TABLET, FILM COATED, EXTENDED RELEASE ORAL
COMMUNITY
Start: 2020-09-25

## 2020-10-13 ASSESSMENT — FIBROSIS 4 INDEX: FIB4 SCORE: 1.16

## 2020-10-13 NOTE — PROGRESS NOTES
Cardiology Follow-up Consultation Note    Date of note:    10/13/2020  Primary Care Provider: Dirk Romo M.D.  Referring Provider: Harry Sanchez M.D.     Patient Name: Christofer Herrera   YOB: 1960  MRN:              [id number]    Chief Complaint: chest pain    History of Present Illness: Christofer Herrera is a 60 y.o. male whose current medical problems include pre-diabetes, sleep apnea, obesity who is here for follow-up.     At our initial visit, 2/15/2019:  Recently had severe left leg pain and presented to the ER for evaluation on 2/13/2019.  This was associated with 5/10 sharp stabbing left sided chest pain which resolved spontaneously after around 1-2 hours.  This usually resolves with simethicone, and he has these chest pain episodes around twice a month.     Of note, he gets severe gas and reflux with drip coffee.      BP at home is 110s/70s, does have white coat syndrome.     Exercises on elliptical machine 30 minutes.  No chest pain, occasional shortness of breath. Walks 6-8 miles a day at work.     Currently has a URI.     Of note, he does have a history of significant hypertriglyceridemia, which did improve after he stopped eating ice cream every day.       At our visit, 8/22/2019:  No chest pain.    In terms of obesity, walking 8 miles a day at work.     Works 11 hours a day, so has not done aerobic exercise.     Interim Events:  In terms of STEVE, remains on CPAP.     Weight down to 232 pounds, but now back up as he's working two jobs. Previously did 30-60 minutes on his elliptical.     In terms of hypertension, BP at home in the 110s/70s.         ROS   Constitution: Negative for chills, fever and night sweats.   HENT: Negative for nosebleeds.    Eyes: Negative for vision loss in left eye and vision loss in right eye.   Respiratory: Negative for hemoptysis.    Gastrointestinal: Negative for hematemesis, hematochezia and melena.   Genitourinary: Negative for hematuria.    Neurological: Negative for focal weakness, numbness and paresthesias.     All other systems reviewed and discussed using a comprehensive questionnaire and are negative.         Past Medical History:   Diagnosis Date   • Cancer (HCC) 2015    treated with brachytherapy prostate   • Disorder of thyroid     hypothryroid   • Hernia of abdominal wall    • Indigestion     occasional   • Low back pain    • Pre-diabetes    • Sleep apnea     CPAP         Past Surgical History:   Procedure Laterality Date   • PB IMPLANT NEUROSTIM/ N/A 5/2/2019    Procedure: INSERTION, NEUROSTIMULATOR, PERMANENT, SPINAL CORD - LEAD REVISION;  Surgeon: Cristin Smart M.D.;  Location: SURGERY [facility];  Service: Pain Management   • SPINAL CORD STIMULATOR  08/20/2018    failed lead, off as of 12/2018   • LUMBAR FUSION ANTERIOR  01/21/2007   • APPENDECTOMY  1970         Current Outpatient Medications   Medication Sig Dispense Refill   • busPIRone (BUSPAR) 15 MG tablet TAKE ONE TABLET BY MOUTH FOUR TIMES DAILY 360 Tab 1   • albuterol 108 (90 Base) MCG/ACT Aero Soln inhalation aerosol Inhale 2 Puffs by mouth every 6 hours as needed for Shortness of Breath. 8.5 g 0   • FLECTOR 1.3 % Patch Apply 1 Patch to skin as directed every bedtime. 30 Patch 3   • levothyroxine (SYNTHROID) 125 MCG Tab Take 1 Tab by mouth Every morning on an empty stomach. 90 Tab 3   • clotrimazole-betamethasone (LOTRISONE) 1-0.05 % Cream Apply 1 g to affected area(s) 2 times a day. 45 g 3   • Diclofenac Sodium (PENNSAID) 2 % Solution Apply 1 g to affected area(s) as needed. 1 Bottle 3   • gabapentin (NEURONTIN) 600 MG tablet Take 600 mg by mouth 3 times a day.     • testosterone cypionate (DEPO-TESTOSTERONE) 200 MG/ML Solution injection every 14 days.     • polyethylene glycol/lytes (MIRALAX) Pack Take 17 g by mouth every day.     • B Complex Vitamins (VITAMIN B COMPLEX PO) Take  by mouth every day.     • Cholecalciferol (VITAMIN D PO) Take  by mouth every day.      • Ginkgo Biloba (GNP GINGKO BILOBA EXTRACT PO) Take  by mouth every day.     • Multiple Vitamins-Minerals (MULTIVITAMIN ADULT PO) Take  by mouth every day.     • NON SPECIFIED Cleared for spinal procedure 1 Each 0   • oxyCODONE immediate release (ROXICODONE) 10 MG immediate release tablet Take 10 mg by mouth 4 times a day as needed for Moderate Pain.     • famotidine (PEPCID) 40 MG Tab Take 40 mg by mouth as needed.     • morphine (MS IR) 30 MG tablet Take 30 mg by mouth 3 times a day.     • ondansetron (ZOFRAN ODT) 8 MG TABLET DISPERSIBLE Take 8 mg by mouth every 8 hours as needed for Nausea.     • morphine ER (MS CONTIN) 30 MG Tab CR tablet      • methylPREDNISolone (MEDROL DOSEPAK) 4 MG Tablet Therapy Pack TAKE AS DIRECTED ON PACKAGE. (Patient not taking: Reported on 9/16/2020) 21 Tab 0     No current facility-administered medications for this visit.          Allergies   Allergen Reactions   • Naproxen Swelling   • Prozac [Fluoxetine] Unspecified     Patient states this would make him unemotional           Family History   Problem Relation Age of Onset   • Hypertension Brother 16   • Heart Attack Paternal Grandfather          Social History     Socioeconomic History   • Marital status:      Spouse name: Not on file   • Number of children: Not on file   • Years of education: Not on file   • Highest education level: Not on file   Occupational History   • Not on file   Social Needs   • Financial resource strain: Not on file   • Food insecurity     Worry: Not on file     Inability: Not on file   • Transportation needs     Medical: Not on file     Non-medical: Not on file   Tobacco Use   • Smoking status: Never Smoker   • Smokeless tobacco: Never Used   • Tobacco comment: second hand smoke exposure   Substance and Sexual Activity   • Alcohol use: Yes     Alcohol/week: 0.0 - 4.2 oz     Frequency: 2-4 times a month     Comment: 1-2drinks every 2-3 weeks   • Drug use: No   • Sexual activity: Yes     Partners:  "Female     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 [place]   Lifestyle   • Physical activity     Days per week: Not on file     Minutes per session: Not on file   • Stress: Not on file   Relationships   • Social connections     Talks on phone: Not on file     Gets together: Not on file     Attends [employer] service: Not on file     Active member of club or organization: Not on file     Attends meetings of clubs or organizations: Not on file     Relationship status: Not on file   • Intimate partner violence     Fear of current or ex partner: Not on file     Emotionally abused: Not on file     Physically abused: Not on file     Forced sexual activity: Not on file   Other Topics Concern   • Not on file   Social History Narrative     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          Physical Exam:  Ambulatory Vitals  BP (!) 0/0 (BP Location: Left arm, Patient Position: Sitting, BP Cuff Size: Adult)   Ht 1.778 m (5' 10\")    Oxygen Therapy:     BP Readings from Last 4 Encounters:   10/13/20 (!) 0/0   09/16/20 128/80   08/16/20 138/80   04/08/20 130/68       Weight/BMI: Body mass index is 34.94 kg/m².  Wt Readings from Last 4 Encounters:   09/16/20 110.5 kg (243 lb 8 oz)   08/16/20 109.3 kg (241 lb)   04/08/20 112.3 kg (247 lb 9.6 oz)   08/22/19 114.8 kg (253 lb)       General: No apparent distress  Eyes: nl conjunctiva  ENT: OP covered by mask  Neck: JVP <8 cm H2O  Lungs: normal respiratory effort, CTAB  Heart: RRR, no murmurs, no rubs or gallops, trace edema bilateral lower extremities. No LV/RV heave on cardiac palpatation. 2+ bilateral radial pulses.    Abdomen: soft, non tender, non distended, no masses, normal bowel sounds.  No HSM.  Extremities/MSK: no clubbing, no cyanosis  Neurological: No focal sensory deficits  Psychiatric: Appropriate affect, A/O x 3, intact judgement and insight  Skin: Warm extremities    Exam repeated in full and unchanged except for as noted above.      Lab Data Review:  No results found for: CHOLSTRLTOT, LDL, HDL, " TRIGLYCERIDE    Lab Results   Component Value Date/Time    SODIUM 141 2020 09:30 AM    POTASSIUM 4.6 2020 09:30 AM    CHLORIDE 103 2020 09:30 AM    CO2 28 2020 09:30 AM    GLUCOSE 97 2020 09:30 AM    BUN 15 2020 09:30 AM    CREATININE 1.06 2020 09:30 AM     Lab Results   Component Value Date/Time    ALKPHOSPHAT 104 (H) 2020 09:30 AM    ASTSGOT 21 2020 09:30 AM    ALTSGPT 25 2020 09:30 AM    TBILIRUBIN 0.5 2020 09:30 AM      Lab Results   Component Value Date/Time    WBC 5.0 2020 09:30 AM     No components found for: HBGA1C  No components found for: TROPONIN  No components found for: BNP      Cardiac Imaging and Procedures Review:    EKG dated 2019 : My personal interpretation is NSR, normal EKG.     Nuclear stress test 2019:  Myocardial Perfusion   Report   NUCLEAR IMAGING INTERPRETATION   * No evidence of significant jeopardized viable myocardium or prior    myocardial infarction.   * Normal left ventricular size, ejection fraction, and wall motion.   * Hypertensive response to exercise.   ECG INTERPRETATION   Negative stress ECG for ischemia.      Radiology test Review:  CXR: 2019     LUNGS: Clear. No effusions.  PNEUMOTHORAX: None.  LINES AND TUBES: Partially visualized intrathecal lead at the level of the mid thoracic spine.  MEDIASTINUM: No cardiomegaly.  MUSCULOSKELETAL STRUCTURES: No acute fracture.    CCS 3/2019:    FINDINGS:    Coronary calcification:  LMA - 0.0  LCX - 0.0  LAD - 30.5  RCA - 0.0  PDA - 0.0    Total Calcium Score: 30.5    Percentile: Calcium score is above the 25th percentile for the patient's age and sex.    Ascending aorta measures 4.1 cm in diameter. Spleen measures 13 cm in length. Degenerative changes are seen in the spine. Lucent lesion with coarse trabeculations in the thoracic spine likely represents a hemangioma.      Medical Decision Makin. Sleep apnea, unspecified type  Continue CPAP    2.  Obesity (BMI 35.0-39.9 without comorbidity) ([facility])  Encouraged improved dietary habits, exercise 4-5 times a week on his elliptical.     3. Pre-diabetes  Control with diet/exercise    4. Atypical chest pain.   Non-cardiac    5. Ascending aortic aneurysm - 4.1cm 3/2019 by coronary calcium score.     6. Primary prevention - CCS 30. Check lipids in 3 months after diet and exercise changes.  Did not do previously. Consider statin. Otherwise recheck CCS in 3/2022.       Return in about 1 year (around 10/13/2021).      Fran Diaz MD, [facility]  [facility], [address]  [address] [address]  Phone: [phone]  Fax: [phone]

## 2020-11-12 ENCOUNTER — NURSE TRIAGE (OUTPATIENT)
Dept: HEALTH INFORMATION MANAGEMENT | Facility: OTHER | Age: 60
End: 2020-11-12

## 2020-11-12 NOTE — TELEPHONE ENCOUNTER
"  1. Caller Name: Christofer                Call Back Number: [phone]  [facility] PCP or Specialty Provider: Yes         2.  In the last two weeks, has the patient had any new or worsening symptoms (not explained by alternative diagnosis)? Yes, the patient reports the following COVID-19 consistent symptoms: congestion or runny nose and headache.    3.  Does patient have any comoribidities? None     4.  Has the patient had any known contact with someone who is suspected or confirmed to have COVID-19? Yes, co-worker same building    5. POTENTIAL PUI (LOW): Advised to perform self care, monitor for worsening symptoms and to call back in 3 days if no improvement. Instructed to self isolate and contact [facility] at [phone]         Note routed to [facility] Provider: TOAN only. Pt transferred to scheduling for virtual visit appt.       Reason for Disposition  • Sinus congestion (pressure, fullness) present > 10 days    Additional Information  • Negative: Lots of coughing    Answer Assessment - Initial Assessment Questions  1. LOCATION: \"Where does it hurt?\"         foreheaD  2. ONSET: \"When did the sinus pain start?\"  (e.g., hours, days)       3 weeks ago  3. SEVERITY: \"How bad is the pain?\"   (Scale 1-10; mild, moderate or severe)    - MILD (1-3): doesn't interfere with normal activities     - MODERATE (4-7): interferes with normal activities (e.g., work or school) or awakens from sleep    - SEVERE (8-10): excruciating pain and patient unable to do any normal activities         fluctuates  4. RECURRENT SYMPTOM: \"Have you ever had sinus problems before?\" If so, ask: \"When was the last time?\" and \"What happened that time?\"       yes  5. NASAL CONGESTION: \"Is the nose blocked?\" If so, ask, \"Can you open it or must you breathe through the mouth?\"      no  6. NASAL DISCHARGE: \"Do you have discharge from your nose?\" If so ask, \"What color?\"      no  7. FEVER: \"Do you have a fever?\" If so, ask: \"What is it, how was it measured, and when did it " "start?\"       no  8. OTHER SYMPTOMS: \"Do you have any other symptoms?\" (e.g., sore throat, cough, earache, difficulty breathing)      headache  9. PREGNANCY: \"Is there any chance you are pregnant?\" \"When was your last menstrual period?\"      n/a    Protocols used: SINUS PAIN AND CONGESTION-A-OH      "

## 2020-11-17 ENCOUNTER — TELEMEDICINE (OUTPATIENT)
Dept: MEDICAL GROUP | Facility: PHYSICIAN GROUP | Age: 60
End: 2020-11-17
Payer: COMMERCIAL

## 2020-11-17 DIAGNOSIS — J01.00 SUBACUTE MAXILLARY SINUSITIS: ICD-10-CM

## 2020-11-17 DIAGNOSIS — E03.4 HYPOTHYROIDISM DUE TO ACQUIRED ATROPHY OF THYROID: ICD-10-CM

## 2020-11-17 PROCEDURE — 99213 OFFICE O/P EST LOW 20 MIN: CPT | Mod: 95,CR | Performed by: FAMILY MEDICINE

## 2020-11-17 RX ORDER — AZITHROMYCIN 250 MG/1
250 TABLET, FILM COATED ORAL DAILY
Qty: 1 QUANTITY SUFFICIENT | Refills: 0 | Status: SHIPPED | OUTPATIENT
Start: 2020-11-17 | End: 2021-06-29

## 2020-11-18 NOTE — PROGRESS NOTES
Over 50% of this 15 minute visit (all time was face to face) was spent on counseling and coordination of care regarding the patient's current problem of   1. Subacute maxillary sinusitis  Patient with 4 weeks of intermittent sinus pain and drainage and congestion, no fever or chills   No constitutional sx  Visit today done via phone due to patient's video issues    2. Hypothyroidism due to acquired atrophy of thyroid                                                 Patient is being treated for hypothyroidism, currently taking meds, no symptoms of fast or slow heartbeat and energy level steady. No new hair loss or skin symptoms. Labs have been checked in past year and are stable on current dose.  controlled        Past Medical History:   Diagnosis Date   • Cancer (HCC) 2015    treated with brachytherapy prostate   • Disorder of thyroid     hypothryroid   • Hernia of abdominal wall    • Indigestion     occasional   • Low back pain    • Pre-diabetes    • Sleep apnea     CPAP     Past Surgical History:   Procedure Laterality Date   • PB IMPLANT NEUROSTIM/ N/A 5/2/2019    Procedure: INSERTION, NEUROSTIMULATOR, PERMANENT, SPINAL CORD - LEAD REVISION;  Surgeon: Cristin Smart M.D.;  Location: SURGERY [facility];  Service: Pain Management   • SPINAL CORD STIMULATOR  08/20/2018    failed lead, off as of 12/2018   • LUMBAR FUSION ANTERIOR  01/21/2007   • APPENDECTOMY  1970     Social History     Tobacco Use   • Smoking status: Never Smoker   • Smokeless tobacco: Never Used   • Tobacco comment: second hand smoke exposure   Substance Use Topics   • Alcohol use: Yes     Alcohol/week: 0.0 - 4.2 oz     Frequency: 2-4 times a month     Comment: 1-2drinks every 2-3 weeks   • Drug use: No     Family History   Problem Relation Age of Onset   • Hypertension Brother 16   • Heart Attack Paternal Grandfather          Current Outpatient Medications:   •  morphine ER (MS CONTIN) 30 MG Tab CR tablet, , Disp: , Rfl:   •  busPIRone  (BUSPAR) 15 MG tablet, TAKE ONE TABLET BY MOUTH FOUR TIMES DAILY, Disp: 360 Tab, Rfl: 1  •  albuterol 108 (90 Base) MCG/ACT Aero Soln inhalation aerosol, Inhale 2 Puffs by mouth every 6 hours as needed for Shortness of Breath., Disp: 8.5 g, Rfl: 0  •  FLECTOR 1.3 % Patch, Apply 1 Patch to skin as directed every bedtime., Disp: 30 Patch, Rfl: 3  •  levothyroxine (SYNTHROID) 125 MCG Tab, Take 1 Tab by mouth Every morning on an empty stomach., Disp: 90 Tab, Rfl: 3  •  clotrimazole-betamethasone (LOTRISONE) 1-0.05 % Cream, Apply 1 g to affected area(s) 2 times a day., Disp: 45 g, Rfl: 3  •  Diclofenac Sodium (PENNSAID) 2 % Solution, Apply 1 g to affected area(s) as needed., Disp: 1 Bottle, Rfl: 3  •  gabapentin (NEURONTIN) 600 MG tablet, Take 600 mg by mouth 3 times a day., Disp: , Rfl:   •  testosterone cypionate (DEPO-TESTOSTERONE) 200 MG/ML Solution injection, every 14 days., Disp: , Rfl:   •  polyethylene glycol/lytes (MIRALAX) Pack, Take 17 g by mouth every day., Disp: , Rfl:   •  B Complex Vitamins (VITAMIN B COMPLEX PO), Take  by mouth every day., Disp: , Rfl:   •  Cholecalciferol (VITAMIN D PO), Take  by mouth every day., Disp: , Rfl:   •  Ginkgo Biloba (GNP GINGKO BILOBA EXTRACT PO), Take  by mouth every day., Disp: , Rfl:   •  Multiple Vitamins-Minerals (MULTIVITAMIN ADULT PO), Take  by mouth every day., Disp: , Rfl:   •  NON SPECIFIED, Cleared for spinal procedure, Disp: 1 Each, Rfl: 0  •  oxyCODONE immediate release (ROXICODONE) 10 MG immediate release tablet, Take 10 mg by mouth 4 times a day as needed for Moderate Pain., Disp: , Rfl:   •  famotidine (PEPCID) 40 MG Tab, Take 40 mg by mouth as needed., Disp: , Rfl:   •  morphine (MS IR) 30 MG tablet, Take 30 mg by mouth 3 times a day., Disp: , Rfl:   •  ondansetron (ZOFRAN ODT) 8 MG TABLET DISPERSIBLE, Take 8 mg by mouth every 8 hours as needed for Nausea., Disp: , Rfl:     Patient was instructed on the use of medications, either prescriptions or OTC and  informed on when the appropriate follow up time period should be. In addition, patient was also instructed that should any acute worsening occur that they should notify this clinic asap or call 911.

## 2020-11-26 ENCOUNTER — PATIENT MESSAGE (OUTPATIENT)
Dept: MEDICAL GROUP | Facility: PHYSICIAN GROUP | Age: 60
End: 2020-11-26

## 2020-11-26 DIAGNOSIS — Z85.46 HISTORY OF PROSTATE CANCER: ICD-10-CM

## 2020-11-30 ENCOUNTER — HOSPITAL ENCOUNTER (OUTPATIENT)
Dept: LAB | Facility: MEDICAL CENTER | Age: 60
End: 2020-11-30
Attending: UROLOGY
Payer: COMMERCIAL

## 2020-11-30 LAB
ALBUMIN SERPL BCP-MCNC: 4.5 G/DL (ref 3.2–4.9)
ALBUMIN/GLOB SERPL: 2 G/DL
ALP SERPL-CCNC: 112 U/L (ref 30–99)
ALT SERPL-CCNC: 24 U/L (ref 2–50)
ANION GAP SERPL CALC-SCNC: 10 MMOL/L (ref 7–16)
AST SERPL-CCNC: 20 U/L (ref 12–45)
BASOPHILS # BLD AUTO: 0.6 % (ref 0–1.8)
BASOPHILS # BLD: 0.04 K/UL (ref 0–0.12)
BILIRUB SERPL-MCNC: 0.5 MG/DL (ref 0.1–1.5)
BUN SERPL-MCNC: 13 MG/DL (ref 8–22)
CALCIUM SERPL-MCNC: 9.6 MG/DL (ref 8.5–10.5)
CHLORIDE SERPL-SCNC: 101 MMOL/L (ref 96–112)
CO2 SERPL-SCNC: 28 MMOL/L (ref 20–33)
CREAT SERPL-MCNC: 1.05 MG/DL (ref 0.5–1.4)
EOSINOPHIL # BLD AUTO: 0.25 K/UL (ref 0–0.51)
EOSINOPHIL NFR BLD: 3.5 % (ref 0–6.9)
ERYTHROCYTE [DISTWIDTH] IN BLOOD BY AUTOMATED COUNT: 42.6 FL (ref 35.9–50)
GLOBULIN SER CALC-MCNC: 2.3 G/DL (ref 1.9–3.5)
GLUCOSE SERPL-MCNC: 118 MG/DL (ref 65–99)
HCT VFR BLD AUTO: 49.1 % (ref 42–52)
HGB BLD-MCNC: 16.5 G/DL (ref 14–18)
IMM GRANULOCYTES # BLD AUTO: 0.02 K/UL (ref 0–0.11)
IMM GRANULOCYTES NFR BLD AUTO: 0.3 % (ref 0–0.9)
LYMPHOCYTES # BLD AUTO: 1.94 K/UL (ref 1–4.8)
LYMPHOCYTES NFR BLD: 27.4 % (ref 22–41)
MCH RBC QN AUTO: 31.4 PG (ref 27–33)
MCHC RBC AUTO-ENTMCNC: 33.6 G/DL (ref 33.7–35.3)
MCV RBC AUTO: 93.3 FL (ref 81.4–97.8)
MONOCYTES # BLD AUTO: 0.44 K/UL (ref 0–0.85)
MONOCYTES NFR BLD AUTO: 6.2 % (ref 0–13.4)
NEUTROPHILS # BLD AUTO: 4.39 K/UL (ref 1.82–7.42)
NEUTROPHILS NFR BLD: 62 % (ref 44–72)
NRBC # BLD AUTO: 0 K/UL
NRBC BLD-RTO: 0 /100 WBC
PLATELET # BLD AUTO: 274 K/UL (ref 164–446)
PMV BLD AUTO: 10.9 FL (ref 9–12.9)
POTASSIUM SERPL-SCNC: 4.2 MMOL/L (ref 3.6–5.5)
PROT SERPL-MCNC: 6.8 G/DL (ref 6–8.2)
PSA SERPL-MCNC: 0.24 NG/ML (ref 0–4)
RBC # BLD AUTO: 5.26 M/UL (ref 4.7–6.1)
SODIUM SERPL-SCNC: 139 MMOL/L (ref 135–145)
TESTOST SERPL-MCNC: 371 NG/DL (ref 175–781)
WBC # BLD AUTO: 7.1 K/UL (ref 4.8–10.8)

## 2020-11-30 PROCEDURE — 84403 ASSAY OF TOTAL TESTOSTERONE: CPT

## 2020-11-30 PROCEDURE — 80053 COMPREHEN METABOLIC PANEL: CPT

## 2020-11-30 PROCEDURE — 84153 ASSAY OF PSA TOTAL: CPT

## 2020-11-30 PROCEDURE — 85025 COMPLETE CBC W/AUTO DIFF WBC: CPT

## 2020-11-30 PROCEDURE — 36415 COLL VENOUS BLD VENIPUNCTURE: CPT

## 2020-11-30 NOTE — PATIENT COMMUNICATION
1. Caller Name: Christofer Herrera  [phone] (home) [phone] (work)                         Call Back Number: [phone] (home) [phone] (work)       How would the patient prefer to be contacted with a response: [employer] message    2. SPECIFIC Action To Be Taken: Referral pending, please sign.    3. Diagnosis/Clinical Reason for Request: Z85.46 (ICD-10-CM) - History of prostate cancer    4. Specialty & Provider Name/Lab/Imaging Location: Referral information sent to the following:  Urology      Dr. Rodrick Chauhan Urologist  [address] [place] [id number]  ??Ph: [phone]  Fax: [phone]    5. Is appointment scheduled for requested order/referral: yes - 12/23    Patient was not informed they will receive a return phone call from the office ONLY if there are any questions before processing their request. Advised to call back if they haven't received a call from the referral department in 5 days.

## 2021-01-06 ENCOUNTER — HOSPITAL ENCOUNTER (OUTPATIENT)
Dept: RADIOLOGY | Facility: MEDICAL CENTER | Age: 61
End: 2021-01-06
Attending: PHYSICIAN ASSISTANT
Payer: COMMERCIAL

## 2021-01-06 DIAGNOSIS — M54.16 LUMBAR RADICULOPATHY: ICD-10-CM

## 2021-01-18 ENCOUNTER — TELEPHONE (OUTPATIENT)
Dept: MEDICAL GROUP | Facility: PHYSICIAN GROUP | Age: 61
End: 2021-01-18

## 2021-01-18 ENCOUNTER — NON-PROVIDER VISIT (OUTPATIENT)
Dept: MEDICAL GROUP | Facility: PHYSICIAN GROUP | Age: 61
End: 2021-01-18
Payer: COMMERCIAL

## 2021-01-18 DIAGNOSIS — Z23 NEED FOR VACCINATION: ICD-10-CM

## 2021-01-18 PROCEDURE — 90750 HZV VACC RECOMBINANT IM: CPT | Performed by: FAMILY MEDICINE

## 2021-01-18 PROCEDURE — 90471 IMMUNIZATION ADMIN: CPT | Performed by: FAMILY MEDICINE

## 2021-01-25 ENCOUNTER — HOSPITAL ENCOUNTER (OUTPATIENT)
Dept: LAB | Facility: MEDICAL CENTER | Age: 61
End: 2021-01-25
Attending: INTERNAL MEDICINE
Payer: COMMERCIAL

## 2021-01-25 DIAGNOSIS — E66.9 OBESITY (BMI 35.0-39.9 WITHOUT COMORBIDITY): ICD-10-CM

## 2021-01-25 DIAGNOSIS — R73.01 IMPAIRED FASTING GLUCOSE: ICD-10-CM

## 2021-01-25 DIAGNOSIS — R73.03 PRE-DIABETES: ICD-10-CM

## 2021-01-25 LAB
CHOLEST SERPL-MCNC: 157 MG/DL (ref 100–199)
EST. AVERAGE GLUCOSE BLD GHB EST-MCNC: 114 MG/DL
FASTING STATUS PATIENT QL REPORTED: NORMAL
HBA1C MFR BLD: 5.6 % (ref 0–5.6)
HDLC SERPL-MCNC: 36 MG/DL
LDLC SERPL CALC-MCNC: 95 MG/DL
TRIGL SERPL-MCNC: 132 MG/DL (ref 0–149)

## 2021-01-25 PROCEDURE — 83036 HEMOGLOBIN GLYCOSYLATED A1C: CPT

## 2021-01-25 PROCEDURE — 36415 COLL VENOUS BLD VENIPUNCTURE: CPT

## 2021-01-25 PROCEDURE — 80061 LIPID PANEL: CPT

## 2021-01-26 ENCOUNTER — HOSPITAL ENCOUNTER (OUTPATIENT)
Dept: RADIOLOGY | Facility: MEDICAL CENTER | Age: 61
End: 2021-01-26
Attending: PHYSICIAN ASSISTANT
Payer: COMMERCIAL

## 2021-01-26 PROCEDURE — 72158 MRI LUMBAR SPINE W/O & W/DYE: CPT

## 2021-01-26 PROCEDURE — A9576 INJ PROHANCE MULTIPACK: HCPCS | Performed by: PHYSICIAN ASSISTANT

## 2021-01-26 PROCEDURE — 700117 HCHG RX CONTRAST REV CODE 255: Performed by: PHYSICIAN ASSISTANT

## 2021-01-26 RX ADMIN — GADOTERIDOL 20 ML: 279.3 INJECTION, SOLUTION INTRAVENOUS at 12:39

## 2021-03-08 RX ORDER — ONDANSETRON 8 MG/1
8 TABLET, ORALLY DISINTEGRATING ORAL EVERY 12 HOURS PRN
Qty: 30 TABLET | Refills: 0 | Status: SHIPPED | OUTPATIENT
Start: 2021-03-08 | End: 2021-11-24

## 2021-03-15 DIAGNOSIS — Z23 NEED FOR VACCINATION: ICD-10-CM

## 2021-03-20 ENCOUNTER — IMMUNIZATION (OUTPATIENT)
Dept: FAMILY PLANNING/WOMEN'S HEALTH CLINIC | Facility: IMMUNIZATION CENTER | Age: 61
End: 2021-03-20
Attending: INTERNAL MEDICINE
Payer: COMMERCIAL

## 2021-03-20 DIAGNOSIS — Z23 ENCOUNTER FOR VACCINATION: Primary | ICD-10-CM

## 2021-03-20 DIAGNOSIS — Z23 NEED FOR VACCINATION: ICD-10-CM

## 2021-03-20 PROCEDURE — 91300 PFIZER SARS-COV-2 VACCINE: CPT

## 2021-03-20 PROCEDURE — 0001A PFIZER SARS-COV-2 VACCINE: CPT

## 2021-04-22 ENCOUNTER — IMMUNIZATION (OUTPATIENT)
Dept: FAMILY PLANNING/WOMEN'S HEALTH CLINIC | Facility: IMMUNIZATION CENTER | Age: 61
End: 2021-04-22
Attending: INTERNAL MEDICINE
Payer: COMMERCIAL

## 2021-04-22 DIAGNOSIS — Z23 ENCOUNTER FOR VACCINATION: Primary | ICD-10-CM

## 2021-04-22 PROCEDURE — 91300 PFIZER SARS-COV-2 VACCINE: CPT

## 2021-04-22 PROCEDURE — 0002A PFIZER SARS-COV-2 VACCINE: CPT

## 2021-05-14 DIAGNOSIS — J20.9 ACUTE BRONCHITIS, UNSPECIFIED ORGANISM: ICD-10-CM

## 2021-05-17 RX ORDER — BUSPIRONE HYDROCHLORIDE 15 MG/1
TABLET ORAL
Qty: 360 TABLET | Refills: 1 | Status: SHIPPED | OUTPATIENT
Start: 2021-05-17 | End: 2021-06-29

## 2021-05-17 RX ORDER — BUSPIRONE HYDROCHLORIDE 15 MG/1
TABLET ORAL
Qty: 360 TABLET | Refills: 1 | Status: SHIPPED | OUTPATIENT
Start: 2021-05-17 | End: 2021-11-19 | Stop reason: SDUPTHER

## 2021-06-18 ENCOUNTER — HOSPITAL ENCOUNTER (OUTPATIENT)
Dept: LAB | Facility: MEDICAL CENTER | Age: 61
End: 2021-06-18
Attending: FAMILY MEDICINE
Payer: COMMERCIAL

## 2021-06-19 ENCOUNTER — HOSPITAL ENCOUNTER (OUTPATIENT)
Dept: LAB | Facility: MEDICAL CENTER | Age: 61
End: 2021-06-19
Attending: UROLOGY
Payer: COMMERCIAL

## 2021-06-19 LAB
ALBUMIN SERPL BCP-MCNC: 4.3 G/DL (ref 3.2–4.9)
ALBUMIN/GLOB SERPL: 1.8 G/DL
ALP SERPL-CCNC: 107 U/L (ref 30–99)
ALT SERPL-CCNC: 21 U/L (ref 2–50)
ANION GAP SERPL CALC-SCNC: 10 MMOL/L (ref 7–16)
AST SERPL-CCNC: 17 U/L (ref 12–45)
BASOPHILS # BLD AUTO: 0.6 % (ref 0–1.8)
BASOPHILS # BLD: 0.03 K/UL (ref 0–0.12)
BILIRUB SERPL-MCNC: 0.8 MG/DL (ref 0.1–1.5)
BUN SERPL-MCNC: 17 MG/DL (ref 8–22)
CALCIUM SERPL-MCNC: 9.1 MG/DL (ref 8.5–10.5)
CHLORIDE SERPL-SCNC: 105 MMOL/L (ref 96–112)
CO2 SERPL-SCNC: 25 MMOL/L (ref 20–33)
CREAT SERPL-MCNC: 1.03 MG/DL (ref 0.5–1.4)
EOSINOPHIL # BLD AUTO: 0.19 K/UL (ref 0–0.51)
EOSINOPHIL NFR BLD: 3.6 % (ref 0–6.9)
ERYTHROCYTE [DISTWIDTH] IN BLOOD BY AUTOMATED COUNT: 41.4 FL (ref 35.9–50)
GLOBULIN SER CALC-MCNC: 2.4 G/DL (ref 1.9–3.5)
GLUCOSE SERPL-MCNC: 115 MG/DL (ref 65–99)
HCT VFR BLD AUTO: 50 % (ref 42–52)
HGB BLD-MCNC: 17 G/DL (ref 14–18)
IMM GRANULOCYTES # BLD AUTO: 0.02 K/UL (ref 0–0.11)
IMM GRANULOCYTES NFR BLD AUTO: 0.4 % (ref 0–0.9)
LYMPHOCYTES # BLD AUTO: 1.23 K/UL (ref 1–4.8)
LYMPHOCYTES NFR BLD: 23.4 % (ref 22–41)
MCH RBC QN AUTO: 31.2 PG (ref 27–33)
MCHC RBC AUTO-ENTMCNC: 34 G/DL (ref 33.7–35.3)
MCV RBC AUTO: 91.7 FL (ref 81.4–97.8)
MONOCYTES # BLD AUTO: 0.3 K/UL (ref 0–0.85)
MONOCYTES NFR BLD AUTO: 5.7 % (ref 0–13.4)
NEUTROPHILS # BLD AUTO: 3.48 K/UL (ref 1.82–7.42)
NEUTROPHILS NFR BLD: 66.3 % (ref 44–72)
NRBC # BLD AUTO: 0 K/UL
NRBC BLD-RTO: 0 /100 WBC
PLATELET # BLD AUTO: 257 K/UL (ref 164–446)
PMV BLD AUTO: 10.3 FL (ref 9–12.9)
POTASSIUM SERPL-SCNC: 4.2 MMOL/L (ref 3.6–5.5)
PROT SERPL-MCNC: 6.7 G/DL (ref 6–8.2)
PSA SERPL-MCNC: 0.17 NG/ML (ref 0–4)
RBC # BLD AUTO: 5.45 M/UL (ref 4.7–6.1)
SODIUM SERPL-SCNC: 140 MMOL/L (ref 135–145)
TESTOST SERPL-MCNC: 183 NG/DL (ref 175–781)
WBC # BLD AUTO: 5.3 K/UL (ref 4.8–10.8)

## 2021-06-19 PROCEDURE — 36415 COLL VENOUS BLD VENIPUNCTURE: CPT

## 2021-06-19 PROCEDURE — 84403 ASSAY OF TOTAL TESTOSTERONE: CPT

## 2021-06-19 PROCEDURE — 84153 ASSAY OF PSA TOTAL: CPT

## 2021-06-19 PROCEDURE — 85025 COMPLETE CBC W/AUTO DIFF WBC: CPT

## 2021-06-19 PROCEDURE — 80053 COMPREHEN METABOLIC PANEL: CPT

## 2021-06-29 ENCOUNTER — OFFICE VISIT (OUTPATIENT)
Dept: MEDICAL GROUP | Facility: PHYSICIAN GROUP | Age: 61
End: 2021-06-29
Payer: COMMERCIAL

## 2021-06-29 VITALS
HEART RATE: 66 BPM | WEIGHT: 244.2 LBS | TEMPERATURE: 98.3 F | RESPIRATION RATE: 20 BRPM | OXYGEN SATURATION: 97 % | HEIGHT: 71 IN | BODY MASS INDEX: 34.19 KG/M2 | SYSTOLIC BLOOD PRESSURE: 112 MMHG | DIASTOLIC BLOOD PRESSURE: 72 MMHG

## 2021-06-29 DIAGNOSIS — R10.10 PAIN OF UPPER ABDOMEN: ICD-10-CM

## 2021-06-29 DIAGNOSIS — Z96.89 SPINAL CORD STIMULATOR STATUS: ICD-10-CM

## 2021-06-29 PROBLEM — E66.9 OBESITY (BMI 35.0-39.9 WITHOUT COMORBIDITY): Status: RESOLVED | Noted: 2018-10-15 | Resolved: 2021-06-29

## 2021-06-29 PROCEDURE — 99214 OFFICE O/P EST MOD 30 MIN: CPT | Performed by: FAMILY MEDICINE

## 2021-06-29 RX ORDER — MORPHINE SULFATE 30 MG/1
TABLET ORAL
COMMUNITY
End: 2021-06-29

## 2021-06-29 RX ORDER — BUSPIRONE HYDROCHLORIDE 15 MG/1
TABLET ORAL
COMMUNITY
End: 2021-06-29

## 2021-06-29 RX ORDER — GABAPENTIN 300 MG/1
CAPSULE ORAL
COMMUNITY
End: 2021-06-29

## 2021-06-29 RX ORDER — METHYLPREDNISOLONE 4 MG/1
TABLET ORAL
COMMUNITY
Start: 2021-05-21 | End: 2021-06-29

## 2021-06-29 ASSESSMENT — ENCOUNTER SYMPTOMS
FEVER: 0
MUSCULOSKELETAL NEGATIVE: 1
ABDOMINAL PAIN: 1
HEMOPTYSIS: 0
EYES NEGATIVE: 1
BELCHING: 0
RESPIRATORY NEGATIVE: 1
NAUSEA: 0
DEPRESSION: 0
FLATUS: 0
CHILLS: 0
CARDIOVASCULAR NEGATIVE: 1
COUGH: 0
PSYCHIATRIC NEGATIVE: 1
DOUBLE VISION: 0
TINGLING: 0
MYALGIAS: 0
BRUISES/BLEEDS EASILY: 0
CONSTITUTIONAL NEGATIVE: 1
DIZZINESS: 0
HEARTBURN: 0
HEADACHES: 0
PALPITATIONS: 0
NEUROLOGICAL NEGATIVE: 1
BLURRED VISION: 0
ARTHRALGIAS: 0

## 2021-06-29 ASSESSMENT — PATIENT HEALTH QUESTIONNAIRE - PHQ9: CLINICAL INTERPRETATION OF PHQ2 SCORE: 0

## 2021-06-29 ASSESSMENT — FIBROSIS 4 INDEX: FIB4 SCORE: 0.89

## 2021-06-29 NOTE — PROGRESS NOTES
Subjective:      Christofer Herrera is a 61 y.o. male who presents with Abdominal Pain and Flank Pain (bilateral )            1. Pain of upper abdomen    - US-ABDOMEN COMPLETE SURVEY; Future    2. Spinal cord stimulator status      Past Medical History:  2015: Cancer (HCC)      Comment:  treated with brachytherapy prostate  No date: Disorder of thyroid      Comment:  hypothryroid  No date: Hernia of abdominal wall  No date: Indigestion      Comment:  occasional  No date: Low back pain  No date: Pre-diabetes  No date: Sleep apnea      Comment:  CPAP  Past Surgical History:  5/2/2019: PB IMPLANT NEUROSTIM/; N/A      Comment:  Procedure: INSERTION, NEUROSTIMULATOR, PERMANENT, SPINAL               CORD - LEAD REVISION;  Surgeon: Cristin Smart M.D.;                 Location: SURGERY [facility];  Service: Pain                Management  08/20/2018: SPINAL CORD STIMULATOR      Comment:  failed lead, off as of 12/2018 01/21/2007: LUMBAR FUSION ANTERIOR  1970: APPENDECTOMY  Social History    Tobacco Use      Smoking status: Never Smoker      Smokeless tobacco: Never Used      Tobacco comment: second hand smoke exposure    Vaping Use      Vaping Use: Never used    Alcohol use: Yes      Alcohol/week: 0.0 - 4.2 oz      Comment: 1-2drinks every 2-3 weeks    Drug use: No    Review of patient's family history indicates:  Problem: Hypertension      Relation: Brother          Age of Onset: 16  Problem: Heart Attack      Relation: Paternal Grandfather          Age of Onset: (Not Specified)      Current Outpatient Medications: •  busPIRone (BUSPAR) 15 MG tablet, TAKE ONE TABLET BY MOUTH FOUR TIMES DAILY, Disp: 360 tablet, Rfl: 1•  ondansetron (ZOFRAN ODT) 8 MG TABLET DISPERSIBLE, Take 1 tablet by mouth every 12 hours as needed for Nausea., Disp: 30 tablet, Rfl: 0•  morphine ER (MS CONTIN) 30 MG Tab CR tablet, , Disp: , Rfl: •  albuterol 108 (90 Base) MCG/ACT Aero Soln inhalation aerosol, Inhale 2 Puffs by mouth every 6  hours as needed for Shortness of Breath., Disp: 8.5 g, Rfl: 0•  FLECTOR 1.3 % Patch, Apply 1 Patch to skin as directed every bedtime., Disp: 30 Patch, Rfl: 3•  levothyroxine (SYNTHROID) 125 MCG Tab, Take 1 Tab by mouth Every morning on an empty stomach., Disp: 90 Tab, Rfl: 3•  clotrimazole-betamethasone (LOTRISONE) 1-0.05 % Cream, Apply 1 g to affected area(s) 2 times a day., Disp: 45 g, Rfl: 3•  Diclofenac Sodium (PENNSAID) 2 % Solution, Apply 1 g to affected area(s) as needed., Disp: 1 Bottle, Rfl: 3•  gabapentin (NEURONTIN) 600 MG tablet, Take 600 mg by mouth 3 times a day., Disp: , Rfl: •  testosterone cypionate (DEPO-TESTOSTERONE) 200 MG/ML Solution injection, every 14 days., Disp: , Rfl: •  polyethylene glycol/lytes (MIRALAX) Pack, Take 17 g by mouth every day., Disp: , Rfl: •  B Complex Vitamins (VITAMIN B COMPLEX PO), Take  by mouth every day., Disp: , Rfl: •  Cholecalciferol (VITAMIN D PO), Take  by mouth every day., Disp: , Rfl: •  Ginkgo Biloba (GNP GINGKO BILOBA EXTRACT PO), Take  by mouth every day., Disp: , Rfl: •  oxyCODONE immediate release (ROXICODONE) 10 MG immediate release tablet, Take 10 mg by mouth 4 times a day as needed for Moderate Pain., Disp: , Rfl: •  famotidine (PEPCID) 40 MG Tab, Take 40 mg by mouth as needed., Disp: , Rfl: •  Multiple Vitamins-Minerals (MULTIVITAMIN ADULT PO), Take  by mouth every day., Disp: , Rfl:     Patient was instructed on the use of medications, either prescriptions or OTC and informed on when the appropriate follow up time period should be. In addition, patient was also instructed that should any acute worsening occur that they should notify this clinic asap or call 911.        Abdominal Pain  This is a new problem. The current episode started more than 1 month ago. The onset quality is undetermined. The problem has been waxing and waning. The pain is located in the generalized abdominal region. The pain is mild. The quality of the pain is sharp. The abdominal  "pain does not radiate. Pertinent negatives include no arthralgias, belching, dysuria, fever, flatus, headaches, myalgias or nausea. Nothing aggravates the pain. The pain is relieved by nothing. He has tried nothing for the symptoms.       Review of Systems   Constitutional: Negative.  Negative for chills and fever.   HENT: Negative.  Negative for hearing loss.    Eyes: Negative.  Negative for blurred vision and double vision.   Respiratory: Negative.  Negative for cough and hemoptysis.    Cardiovascular: Negative.  Negative for chest pain and palpitations.   Gastrointestinal: Positive for abdominal pain. Negative for flatus, heartburn and nausea.   Genitourinary: Negative.  Negative for dysuria.   Musculoskeletal: Negative.  Negative for arthralgias and myalgias.   Skin: Negative.  Negative for rash.   Neurological: Negative.  Negative for dizziness, tingling and headaches.   Endo/Heme/Allergies: Negative.  Does not bruise/bleed easily.   Psychiatric/Behavioral: Negative.  Negative for depression and suicidal ideas.   All other systems reviewed and are negative.         Objective:     /72 (BP Location: Right arm, Patient Position: Sitting, BP Cuff Size: Adult)   Pulse 66   Temp 36.8 °C (98.3 °F) (Temporal)   Resp 20   Ht 1.791 m (5' 10.5\")   Wt 111 kg (244 lb 3.2 oz)   SpO2 97%   BMI 34.54 kg/m²      Physical Exam  Vitals and nursing note reviewed.   Constitutional:       General: He is not in acute distress.     Appearance: He is well-developed. He is not diaphoretic.   HENT:      Head: Normocephalic and atraumatic.      Right Ear: External ear normal.      Left Ear: External ear normal.      Nose: Nose normal.      Mouth/Throat:      Pharynx: No oropharyngeal exudate.   Eyes:      General: No scleral icterus.        Right eye: No discharge.         Left eye: No discharge.      Pupils: Pupils are equal, round, and reactive to light.   Neck:      Thyroid: No thyromegaly.      Vascular: No JVD.      " Trachea: No tracheal deviation.   Cardiovascular:      Rate and Rhythm: Normal rate and regular rhythm.      Heart sounds: Normal heart sounds. No murmur heard.   No friction rub. No gallop.    Pulmonary:      Effort: Pulmonary effort is normal. No respiratory distress.      Breath sounds: Normal breath sounds. No stridor. No wheezing or rales.   Chest:      Chest wall: No tenderness.   Abdominal:      General: There is no distension.      Palpations: Abdomen is soft.      Tenderness: There is no abdominal tenderness.   Musculoskeletal:      Cervical back: Normal range of motion and neck supple.   Lymphadenopathy:      Cervical: No cervical adenopathy.   Neurological:      Mental Status: He is alert and oriented to person, place, and time.      Cranial Nerves: No cranial nerve deficit.   Psychiatric:         Behavior: Behavior normal.         Thought Content: Thought content normal.         Judgment: Judgment normal.                        Assessment/Plan:        1. Pain of upper abdomen    - US-ABDOMEN COMPLETE SURVEY; Future    2. Spinal cord stimulator status

## 2021-07-06 RX ORDER — SIMETHICONE 125 MG
1 CAPSULE ORAL
COMMUNITY
End: 2021-07-06 | Stop reason: SDUPTHER

## 2021-07-06 RX ORDER — SIMETHICONE 125 MG
125 CAPSULE ORAL
Qty: 120 CAPSULE | Refills: 0 | Status: SHIPPED | OUTPATIENT
Start: 2021-07-06 | End: 2021-09-15 | Stop reason: SDUPTHER

## 2021-07-06 NOTE — TELEPHONE ENCOUNTER
Received request via: Patient    Was the patient seen in the last year in this department? Yes    Does the patient have an active prescription (recently filled or refills available) for medication(s) requested? No     Requested Prescriptions     Pending Prescriptions Disp Refills   • Simethicone 125 MG Cap 120 capsule 0     Sig: Take 1 capsule by mouth 4 Times a Day,Before Meals and at Bedtime.

## 2021-07-25 ENCOUNTER — HOSPITAL ENCOUNTER (OUTPATIENT)
Dept: RADIOLOGY | Facility: MEDICAL CENTER | Age: 61
End: 2021-07-25
Attending: FAMILY MEDICINE
Payer: COMMERCIAL

## 2021-07-25 DIAGNOSIS — R10.10 PAIN OF UPPER ABDOMEN: ICD-10-CM

## 2021-07-25 PROCEDURE — 76700 US EXAM ABDOM COMPLETE: CPT

## 2021-07-26 ENCOUNTER — TELEPHONE (OUTPATIENT)
Dept: MEDICAL GROUP | Facility: PHYSICIAN GROUP | Age: 61
End: 2021-07-26

## 2021-07-26 NOTE — TELEPHONE ENCOUNTER
----- Message from Dirk Romo M.D. sent at 7/26/2021  8:13 AM PDT -----  This patient needs an appointment within the next week. Please schedule this with the patient so we may discuss their lab results

## 2021-08-03 ENCOUNTER — OFFICE VISIT (OUTPATIENT)
Dept: MEDICAL GROUP | Facility: PHYSICIAN GROUP | Age: 61
End: 2021-08-03
Payer: COMMERCIAL

## 2021-08-03 VITALS
WEIGHT: 241.3 LBS | HEIGHT: 71 IN | RESPIRATION RATE: 24 BRPM | TEMPERATURE: 97.4 F | SYSTOLIC BLOOD PRESSURE: 120 MMHG | DIASTOLIC BLOOD PRESSURE: 70 MMHG | HEART RATE: 82 BPM | BODY MASS INDEX: 33.78 KG/M2 | OXYGEN SATURATION: 93 %

## 2021-08-03 DIAGNOSIS — K80.20 GALLSTONES: ICD-10-CM

## 2021-08-03 PROCEDURE — 99214 OFFICE O/P EST MOD 30 MIN: CPT | Performed by: FAMILY MEDICINE

## 2021-08-03 RX ORDER — BUSPIRONE HYDROCHLORIDE 15 MG/1
TABLET ORAL
COMMUNITY
End: 2021-08-03

## 2021-08-03 ASSESSMENT — ENCOUNTER SYMPTOMS
RESPIRATORY NEGATIVE: 1
DOUBLE VISION: 0
CONSTITUTIONAL NEGATIVE: 1
PSYCHIATRIC NEGATIVE: 1
TINGLING: 0
NAUSEA: 1
MUSCULOSKELETAL NEGATIVE: 1
DIZZINESS: 0
BRUISES/BLEEDS EASILY: 0
BLURRED VISION: 0
EYES NEGATIVE: 1
COUGH: 0
CHILLS: 0
PALPITATIONS: 0
DEPRESSION: 0
NEUROLOGICAL NEGATIVE: 1
HEADACHES: 0
FEVER: 0
HEARTBURN: 0
CARDIOVASCULAR NEGATIVE: 1
MYALGIAS: 0
ABDOMINAL PAIN: 1
HEMOPTYSIS: 0

## 2021-08-03 ASSESSMENT — FIBROSIS 4 INDEX: FIB4 SCORE: 0.89

## 2021-08-03 NOTE — PROGRESS NOTES
Subjective:      Christofer Herrera is a 61 y.o. male who presents with Results (US results review)            1. Gallstones  Still with pain in ruq with eating, will send to surgery for eval of gallstones on usg  - REFERRAL TO GENERAL SURGERY    Past Medical History:  2015: Cancer (HCC)      Comment:  treated with brachytherapy prostate  No date: Disorder of thyroid      Comment:  hypothryroid  No date: Hernia of abdominal wall  No date: Indigestion      Comment:  occasional  No date: Low back pain  No date: Pre-diabetes  No date: Sleep apnea      Comment:  CPAP  Past Surgical History:  5/2/2019: PB IMPLANT NEUROSTIM/; N/A      Comment:  Procedure: INSERTION, NEUROSTIMULATOR, PERMANENT, SPINAL               CORD - LEAD REVISION;  Surgeon: Cristin Smart M.D.;                 Location: SURGERY [facility];  Service: Pain                Management  08/20/2018: SPINAL CORD STIMULATOR      Comment:  failed lead, off as of 12/2018 01/21/2007: LUMBAR FUSION ANTERIOR  1970: APPENDECTOMY  Social History    Tobacco Use      Smoking status: Never Smoker      Smokeless tobacco: Never Used      Tobacco comment: second hand smoke exposure    Vaping Use      Vaping Use: Never used    Alcohol use: Yes      Alcohol/week: 0.0 - 4.2 oz      Comment: 1-2drinks every 2-3 weeks    Drug use: No    Review of patient's family history indicates:  Problem: Hypertension      Relation: Brother          Age of Onset: 16  Problem: Heart Attack      Relation: Paternal Grandfather          Age of Onset: (Not Specified)      Current Outpatient Medications: •  Simethicone 125 MG Cap, Take 1 capsule by mouth 4 Times a Day,Before Meals and at Bedtime., Disp: 120 capsule, Rfl: 0•  busPIRone (BUSPAR) 15 MG tablet, TAKE ONE TABLET BY MOUTH FOUR TIMES DAILY, Disp: 360 tablet, Rfl: 1•  ondansetron (ZOFRAN ODT) 8 MG TABLET DISPERSIBLE, Take 1 tablet by mouth every 12 hours as needed for Nausea., Disp: 30 tablet, Rfl: 0•  morphine ER (MS CONTIN)  30 MG Tab CR tablet, , Disp: , Rfl: •  albuterol 108 (90 Base) MCG/ACT Aero Soln inhalation aerosol, Inhale 2 Puffs by mouth every 6 hours as needed for Shortness of Breath., Disp: 8.5 g, Rfl: 0•  FLECTOR 1.3 % Patch, Apply 1 Patch to skin as directed every bedtime., Disp: 30 Patch, Rfl: 3•  levothyroxine (SYNTHROID) 125 MCG Tab, Take 1 Tab by mouth Every morning on an empty stomach., Disp: 90 Tab, Rfl: 3•  clotrimazole-betamethasone (LOTRISONE) 1-0.05 % Cream, Apply 1 g to affected area(s) 2 times a day., Disp: 45 g, Rfl: 3•  Diclofenac Sodium (PENNSAID) 2 % Solution, Apply 1 g to affected area(s) as needed., Disp: 1 Bottle, Rfl: 3•  gabapentin (NEURONTIN) 600 MG tablet, Take 600 mg by mouth 3 times a day., Disp: , Rfl: •  testosterone cypionate (DEPO-TESTOSTERONE) 200 MG/ML Solution injection, every 14 days., Disp: , Rfl: •  polyethylene glycol/lytes (MIRALAX) Pack, Take 17 g by mouth every day., Disp: , Rfl: •  B Complex Vitamins (VITAMIN B COMPLEX PO), Take  by mouth every day., Disp: , Rfl: •  Cholecalciferol (VITAMIN D PO), Take  by mouth every day., Disp: , Rfl: •  Ginkgo Biloba (GNP GINGKO BILOBA EXTRACT PO), Take  by mouth every day., Disp: , Rfl: •  Multiple Vitamins-Minerals (MULTIVITAMIN ADULT PO), Take  by mouth every day., Disp: , Rfl: •  oxyCODONE immediate release (ROXICODONE) 10 MG immediate release tablet, Take 10 mg by mouth 4 times a day as needed for Moderate Pain., Disp: , Rfl: •  famotidine (PEPCID) 40 MG Tab, Take 40 mg by mouth as needed., Disp: , Rfl:     Patient was instructed on the use of medications, either prescriptions or OTC and informed on when the appropriate follow up time period should be. In addition, patient was also instructed that should any acute worsening occur that they should notify this clinic asap or call 911.          Review of Systems   Constitutional: Negative.  Negative for chills and fever.   HENT: Negative.  Negative for hearing loss.    Eyes: Negative.  Negative  "for blurred vision and double vision.   Respiratory: Negative.  Negative for cough and hemoptysis.    Cardiovascular: Negative.  Negative for chest pain and palpitations.   Gastrointestinal: Positive for abdominal pain and nausea. Negative for heartburn.   Genitourinary: Negative.  Negative for dysuria.   Musculoskeletal: Negative.  Negative for myalgias.   Skin: Negative.  Negative for rash.   Neurological: Negative.  Negative for dizziness, tingling and headaches.   Endo/Heme/Allergies: Negative.  Does not bruise/bleed easily.   Psychiatric/Behavioral: Negative.  Negative for depression and suicidal ideas.   All other systems reviewed and are negative.         Objective:     /70 (BP Location: Right arm, Patient Position: Sitting, BP Cuff Size: Adult)   Pulse 82   Temp 36.3 °C (97.4 °F) (Temporal)   Resp (!) 24   Ht 1.791 m (5' 10.5\")   Wt 109 kg (241 lb 4.8 oz)   SpO2 93%   BMI 34.13 kg/m²      Physical Exam  Vitals and nursing note reviewed.   Constitutional:       General: He is not in acute distress.     Appearance: He is well-developed. He is not diaphoretic.   HENT:      Head: Normocephalic and atraumatic.      Mouth/Throat:      Pharynx: No oropharyngeal exudate.   Eyes:      Pupils: Pupils are equal, round, and reactive to light.   Cardiovascular:      Rate and Rhythm: Normal rate and regular rhythm.      Heart sounds: Normal heart sounds. No murmur heard.   No friction rub. No gallop.    Pulmonary:      Effort: Pulmonary effort is normal. No respiratory distress.      Breath sounds: Normal breath sounds. No wheezing or rales.   Chest:      Chest wall: No tenderness.   Neurological:      Mental Status: He is alert and oriented to person, place, and time.   Psychiatric:         Behavior: Behavior normal.         Thought Content: Thought content normal.         Judgment: Judgment normal.                        Assessment/Plan:        1. Gallstones    - REFERRAL TO GENERAL SURGERY    "

## 2021-08-16 DIAGNOSIS — M54.40 CHRONIC LOW BACK PAIN WITH SCIATICA, SCIATICA LATERALITY UNSPECIFIED, UNSPECIFIED BACK PAIN LATERALITY: ICD-10-CM

## 2021-08-16 DIAGNOSIS — G89.29 CHRONIC LOW BACK PAIN WITH SCIATICA, SCIATICA LATERALITY UNSPECIFIED, UNSPECIFIED BACK PAIN LATERALITY: ICD-10-CM

## 2021-08-16 NOTE — PROGRESS NOTES
Dr. Romo,    Patient requires an updated pain referral, it is pended for your approval.    Thanks,    Selene

## 2021-09-08 ENCOUNTER — TELEMEDICINE (OUTPATIENT)
Dept: MEDICAL GROUP | Facility: PHYSICIAN GROUP | Age: 61
End: 2021-09-08
Payer: COMMERCIAL

## 2021-09-08 VITALS — BODY MASS INDEX: 33.6 KG/M2 | WEIGHT: 240 LBS | HEIGHT: 71 IN

## 2021-09-08 DIAGNOSIS — G89.29 CHRONIC LOW BACK PAIN WITH SCIATICA, SCIATICA LATERALITY UNSPECIFIED, UNSPECIFIED BACK PAIN LATERALITY: ICD-10-CM

## 2021-09-08 DIAGNOSIS — M54.40 CHRONIC LOW BACK PAIN WITH SCIATICA, SCIATICA LATERALITY UNSPECIFIED, UNSPECIFIED BACK PAIN LATERALITY: ICD-10-CM

## 2021-09-08 PROCEDURE — 99213 OFFICE O/P EST LOW 20 MIN: CPT | Mod: 95 | Performed by: FAMILY MEDICINE

## 2021-09-08 RX ORDER — DIAZEPAM 5 MG/1
TABLET ORAL
COMMUNITY
Start: 2021-08-24 | End: 2021-09-08

## 2021-09-08 ASSESSMENT — FIBROSIS 4 INDEX: FIB4 SCORE: 0.89
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
## 2021-09-15 DIAGNOSIS — E03.4 HYPOTHYROIDISM DUE TO ACQUIRED ATROPHY OF THYROID: ICD-10-CM

## 2021-09-15 RX ORDER — SIMETHICONE 125 MG
125 CAPSULE ORAL
Qty: 120 CAPSULE | Refills: 3 | Status: SHIPPED | OUTPATIENT
Start: 2021-09-15

## 2021-09-15 RX ORDER — LEVOTHYROXINE SODIUM 0.12 MG/1
125 TABLET ORAL
Qty: 90 TABLET | Refills: 3 | Status: SHIPPED | OUTPATIENT
Start: 2021-09-15 | End: 2022-05-19

## 2021-09-28 ENCOUNTER — PRE-ADMISSION TESTING (OUTPATIENT)
Dept: ADMISSIONS | Facility: MEDICAL CENTER | Age: 61
End: 2021-09-28
Attending: SURGERY
Payer: COMMERCIAL

## 2021-09-28 DIAGNOSIS — Z01.810 PRE-OPERATIVE CARDIOVASCULAR EXAMINATION: ICD-10-CM

## 2021-09-28 DIAGNOSIS — Z01.812 PRE-OPERATIVE LABORATORY EXAMINATION: ICD-10-CM

## 2021-09-28 LAB
ANION GAP SERPL CALC-SCNC: 11 MMOL/L (ref 7–16)
BUN SERPL-MCNC: 14 MG/DL (ref 8–22)
CALCIUM SERPL-MCNC: 9.5 MG/DL (ref 8.4–10.2)
CHLORIDE SERPL-SCNC: 106 MMOL/L (ref 96–112)
CO2 SERPL-SCNC: 23 MMOL/L (ref 20–33)
CREAT SERPL-MCNC: 0.92 MG/DL (ref 0.5–1.4)
GLUCOSE SERPL-MCNC: 109 MG/DL (ref 65–99)
POTASSIUM SERPL-SCNC: 4.2 MMOL/L (ref 3.6–5.5)
SODIUM SERPL-SCNC: 140 MMOL/L (ref 135–145)

## 2021-09-28 PROCEDURE — 93005 ELECTROCARDIOGRAM TRACING: CPT

## 2021-09-28 PROCEDURE — 36415 COLL VENOUS BLD VENIPUNCTURE: CPT

## 2021-09-28 PROCEDURE — 83036 HEMOGLOBIN GLYCOSYLATED A1C: CPT

## 2021-09-28 PROCEDURE — 93010 ELECTROCARDIOGRAM REPORT: CPT | Performed by: INTERNAL MEDICINE

## 2021-09-28 PROCEDURE — 80048 BASIC METABOLIC PNL TOTAL CA: CPT

## 2021-09-28 RX ORDER — DIPHENHYDRAMINE HCL 25 MG
25 TABLET ORAL EVERY 6 HOURS PRN
COMMUNITY

## 2021-09-28 RX ORDER — UREA 10 %
5 LOTION (ML) TOPICAL
COMMUNITY

## 2021-09-28 ASSESSMENT — FIBROSIS 4 INDEX: FIB4 SCORE: 0.89

## 2021-09-28 NOTE — PREPROCEDURE INSTRUCTIONS
Hi Dr. Javed,  Attached is an EKG for MRN [id number].  Please review EKG and advise.  Thanks, Yina GERMAN,  RN      Anesthesia Summary Report           Patient Name: Christofer Herrera MRN: [id number] Admission Date: Patient not admitted   Allergies: Naproxen, Prozac [Fluoxetine]      61 y.o. / Male (1960)  Christofer Herrera MRN:[id number] (CSN:[id number]) (61 y.o. M)

## 2021-09-28 NOTE — PREPROCEDURE INSTRUCTIONS
Hx and meds reviewed, pre-op instructions given, handouts reviewed, questions answered.  Patient instructed to continue regularly prescribed medication s through day before surgery.  Instructed to discontinue all vitamins and supplements 2 week prior to DOS.  Per anesthesia protocol patient instructed to take these mediations with a sip of water on the day of surgery:  Albuterol inhaler, Buspar, Voltaren, Pecid, neurontin, synthroid, MS Contin, Zofran, Oxycodone, Simethicone. Anesthesia fasting guidelines reviewed with patient.  Covid testing 10/6/21.  Patient has been vaccinated for Covid.

## 2021-09-29 LAB
EST. AVERAGE GLUCOSE BLD GHB EST-MCNC: 117 MG/DL
HBA1C MFR BLD: 5.7 % (ref 4–5.6)

## 2021-09-29 NOTE — OR NURSING
Patient’s ECG reveals a normal sinus rhythm with some J point elevation in the inferior leads. The functional capacity becomes very important in this case as patient does not have any other significant risk factors for CAD. Also, please inform patient that he needs to have the means to shut off his spinal cord stimulator for the surgery and have it turned back on after the surgery as he needs it. Thank you  Martha Javed M.D.  [facility]    Called patient and he will bring the means to shut off his spinal cord stimulaor the day of surgery

## 2021-09-30 ENCOUNTER — TELEPHONE (OUTPATIENT)
Dept: CARDIOLOGY | Facility: MEDICAL CENTER | Age: 61
End: 2021-09-30

## 2021-09-30 NOTE — TELEPHONE ENCOUNTER
CASSANDRA    Pt is having gallbladder surgery on 10/11 and needs surgical clearance. He wants to confirm that we received the faxed request for clearance.    He was advised that he needs to be seen as it has been more than 3 months. First available appt is 10/26 and he needs to be seen before then.     Best contact number: [phone]    Thank you,    Talisha DILL

## 2021-09-30 NOTE — OR NURSING
Pt called and stated that Dr Ibrahim is requiring a Cardiac clearance prior to surgery and he checked with his cardiologist and they have not received the request yet. Pt gave me Dr Diaz his cardiologist fax number [phone], and states they told him that they need this request with the date of surgery on the fax. Called Dr Graff office left message for surgery scheduler to find out if they already sent a request. Pt stated this is time sensitive since Dr Diaz's office will have to try and squeeze him in prior to surgery. Faxed a request for clearance to Dr Braden office.

## 2021-10-01 NOTE — TELEPHONE ENCOUNTER
Last appointment 10-13-20.  Hx.: Ascending aortic aneurysm, CCS 30, Obesity, sleep apnea.  2-2019 MPI normal.    Dr. Roshan Ibrahim/Dr. Javed.  [facility] pre-admission fax [phone].    To Dr. Diaz.  Dr. Diaz - please see EKG done 9-28-21 read by Dr. Carbal interpreted as   a fib. Pt. Said he was wearing his spinal stimulator and thinks that may have affected the EKG.

## 2021-10-01 NOTE — TELEPHONE ENCOUNTER
Pt called to check on his medical clearance. Pt is having gall bladder surgery with Dr. Ibrahim with [facility] on 10-11.  Pt can be reached at [phone].    Thank you

## 2021-10-04 ENCOUNTER — TELEPHONE (OUTPATIENT)
Dept: SCHEDULING | Facility: IMAGING CENTER | Age: 61
End: 2021-10-04

## 2021-10-04 NOTE — TELEPHONE ENCOUNTER
Scheduled pt. To see Lindsey Zuluaga. At 3pm for surgical clearance, new onset a fib. Left message for pt. To call.  My Chart message sent alerting him about appointment.

## 2021-10-05 ENCOUNTER — OFFICE VISIT (OUTPATIENT)
Dept: CARDIOLOGY | Facility: MEDICAL CENTER | Age: 61
End: 2021-10-05
Payer: COMMERCIAL

## 2021-10-05 VITALS
BODY MASS INDEX: 33.32 KG/M2 | OXYGEN SATURATION: 98 % | DIASTOLIC BLOOD PRESSURE: 72 MMHG | HEIGHT: 71 IN | WEIGHT: 238 LBS | RESPIRATION RATE: 16 BRPM | HEART RATE: 71 BPM | SYSTOLIC BLOOD PRESSURE: 120 MMHG

## 2021-10-05 DIAGNOSIS — R93.1 AGATSTON CORONARY ARTERY CALCIUM SCORE LESS THAN 100: ICD-10-CM

## 2021-10-05 DIAGNOSIS — G47.33 OSA ON CPAP: ICD-10-CM

## 2021-10-05 DIAGNOSIS — I71.21 ASCENDING AORTIC ANEURYSM (HCC): ICD-10-CM

## 2021-10-05 DIAGNOSIS — R73.03 PRE-DIABETES: ICD-10-CM

## 2021-10-05 LAB
EKG IMPRESSION: NORMAL
EKG IMPRESSION: NORMAL

## 2021-10-05 PROCEDURE — 93000 ELECTROCARDIOGRAM COMPLETE: CPT | Performed by: INTERNAL MEDICINE

## 2021-10-05 PROCEDURE — 99214 OFFICE O/P EST MOD 30 MIN: CPT | Performed by: NURSE PRACTITIONER

## 2021-10-05 ASSESSMENT — FIBROSIS 4 INDEX: FIB4 SCORE: 0.89

## 2021-10-06 ENCOUNTER — TELEPHONE (OUTPATIENT)
Dept: CARDIOLOGY | Facility: MEDICAL CENTER | Age: 61
End: 2021-10-06

## 2021-10-06 ASSESSMENT — ENCOUNTER SYMPTOMS
PND: 0
FEVER: 0
SHORTNESS OF BREATH: 0
COUGH: 0
CLAUDICATION: 0
ABDOMINAL PAIN: 0
ORTHOPNEA: 0
MYALGIAS: 0
PALPITATIONS: 0
DIZZINESS: 0

## 2021-10-06 NOTE — PROGRESS NOTES
Chief Complaint   Patient presents with   • Atrial Fibrillation       Subjective     Christofer Herrera is a 61 y.o. male who presents today for surgical clearance for abnormal EKG found on pre-op EKG.    He is a patient of Dr. Diaz in our office. Hx of CAD (CT score LAD 30 in '19), HTN, hypothyroidism, arthritis, pre-diabetes, STEVE on CPAP, prostate CA, and obesity.    He presents today alone. He had an abnormal EKG showing afib, but unfortunately this was misread and the MD that read this corrected his reading. His spinal stimulator was on and it created a false computer reading.    EKG was repeated today and showed NSR. He has no cardiac symptoms to report.    He has no concerns today. He is just hoping to get cleared for his upcoming procedure.    He has no chest pain, shortness of breath, edema, dizziness/lightheadedness, or palpitations.    Past Medical History:   Diagnosis Date   • Arthritis     lumbar   • Asthma     occasional   • Bronchitis     2017   • Cancer (HCC) 2015    treated with brachytherapy prostate   • Disorder of thyroid     hypothryroid   • Heart burn    • Hernia of abdominal wall    • Indigestion     occasional   • Low back pain     low back pain   • Pre-diabetes    • Psychiatric problem     anxiety   • Sleep apnea     CPAP     Past Surgical History:   Procedure Laterality Date   • PB IMPLANT NEUROSTIM/ N/A 5/2/2019    Procedure: INSERTION, NEUROSTIMULATOR, PERMANENT, SPINAL CORD - LEAD REVISION;  Surgeon: Cristin Smart M.D.;  Location: SURGERY [facility];  Service: Pain Management   • SPINAL CORD STIMULATOR  08/20/2018    failed lead, off as of 12/2018   • LUMBAR FUSION ANTERIOR  01/21/2007   • APPENDECTOMY  1970     Family History   Problem Relation Age of Onset   • Hypertension Brother 16   • Heart Attack Paternal Grandfather      Social History     Socioeconomic History   • Marital status:      Spouse name: Not on file   • Number of children: Not on file   • Years of  education: Not on file   • Highest education level: Not on file   Occupational History   • Not on file   Tobacco Use   • Smoking status: Never Smoker   • Smokeless tobacco: Never Used   • Tobacco comment: second hand smoke exposure   Vaping Use   • Vaping Use: Never used   Substance and Sexual Activity   • Alcohol use: Not Currently   • Drug use: No   • Sexual activity: Yes     Partners: Female   Other Topics Concern   • Not on file   Social History Narrative     at [employer].      Social Determinants of Health     Financial Resource Strain:    • Difficulty of Paying Living Expenses:    Food Insecurity:    • Worried About Running Out of Food in the Last Year:    • Ran Out of Food in the Last Year:    Transportation Needs:    • Lack of Transportation (Medical):    • Lack of Transportation (Non-Medical):    Physical Activity:    • Days of Exercise per Week:    • Minutes of Exercise per Session:    Stress:    • Feeling of Stress :    Social Connections:    • Frequency of Communication with Friends and Family:    • Frequency of Social Gatherings with Friends and Family:    • Attends [employer] Services:    • Active Member of Clubs or Organizations:    • Attends Club or Organization Meetings:    • Marital Status:    Intimate Partner Violence:    • Fear of Current or Ex-Partner:    • Emotionally Abused:    • Physically Abused:    • Sexually Abused:      Allergies   Allergen Reactions   • Naproxen Swelling   • Prozac [Fluoxetine] Unspecified     Patient states this would make him unemotional       Outpatient Encounter Medications as of 10/5/2021   Medication Sig Dispense Refill   • diclofenac sodium (VOLTAREN) 1 % Gel Apply  topically 4 times a day as needed.     • diphenhydrAMINE (BENADRYL) 25 MG Tab Take 25 mg by mouth every 6 hours as needed for Sleep.     • melatonin 1 mg Tab Take 5 mg by mouth.     • levothyroxine (SYNTHROID) 125 MCG Tab Take 1 Tablet by mouth every morning on an empty stomach. 90 Tablet  3   • Simethicone 125 MG Cap Take 1 Capsule by mouth 4 Times a Day,Before Meals and at Bedtime. 120 Capsule 3   • busPIRone (BUSPAR) 15 MG tablet TAKE ONE TABLET BY MOUTH FOUR TIMES DAILY 360 tablet 1   • ondansetron (ZOFRAN ODT) 8 MG TABLET DISPERSIBLE Take 1 tablet by mouth every 12 hours as needed for Nausea. 30 tablet 0   • albuterol 108 (90 Base) MCG/ACT Aero Soln inhalation aerosol Inhale 2 Puffs by mouth every 6 hours as needed for Shortness of Breath. 8.5 g 0   • FLECTOR 1.3 % Patch Apply 1 Patch to skin as directed every bedtime. 30 Patch 3   • Diclofenac Sodium (PENNSAID) 2 % Solution Apply 1 g to affected area(s) as needed. 1 Bottle 3   • gabapentin (NEURONTIN) 600 MG tablet Take 600 mg by mouth 3 times a day.     • testosterone cypionate (DEPO-TESTOSTERONE) 200 MG/ML Solution injection every 14 days.     • polyethylene glycol/lytes (MIRALAX) Pack Take 17 g by mouth every day.     • famotidine (PEPCID) 40 MG Tab Take 40 mg by mouth as needed.     • [DISCONTINUED] FLECTOR 1.3 % Patch      • [DISCONTINUED] ibuprofen (MOTRIN) 100 MG/5ML Suspension Take 10 mg/kg by mouth every 6 hours as needed. (Patient not taking: Reported on 10/5/2021)     • [DISCONTINUED] Milk Thistle 1000 MG Cap Take  by mouth. (Patient not taking: Reported on 10/5/2021)     • morphine ER (MS CONTIN) 30 MG Tab CR tablet      • [DISCONTINUED] B Complex Vitamins (VITAMIN B COMPLEX PO) Take  by mouth every day. (Patient not taking: Reported on 10/5/2021)     • [DISCONTINUED] Cholecalciferol (VITAMIN D PO) Take  by mouth every day. (Patient not taking: Reported on 10/5/2021)     • [DISCONTINUED] Ginkgo Biloba (GNP GINGKO BILOBA EXTRACT PO) Take  by mouth every day. (Patient not taking: Reported on 10/5/2021)     • [DISCONTINUED] Multiple Vitamins-Minerals (MULTIVITAMIN ADULT PO) Take  by mouth every day. (Patient not taking: Reported on 10/5/2021)     • oxyCODONE immediate release (ROXICODONE) 10 MG immediate release tablet Take 10 mg by  "mouth 4 times a day as needed for Moderate Pain.       No facility-administered encounter medications on file as of 10/5/2021.     Review of Systems   Constitutional: Negative for fever and malaise/fatigue.   Respiratory: Negative for cough and shortness of breath.    Cardiovascular: Negative for chest pain, palpitations, orthopnea, claudication, leg swelling and PND.   Gastrointestinal: Negative for abdominal pain.   Musculoskeletal: Negative for myalgias.   Neurological: Negative for dizziness.              Objective     /72 (BP Location: Left arm, Patient Position: Sitting, BP Cuff Size: Adult)   Pulse 71   Resp 16   Ht 1.791 m (5' 10.5\")   Wt 108 kg (238 lb)   SpO2 98%   BMI 33.67 kg/m²     Physical Exam  Vitals and nursing note reviewed.   Constitutional:       Appearance: Normal appearance. He is well-developed and normal weight.   HENT:      Head: Normocephalic and atraumatic.   Neck:      Vascular: No JVD.   Cardiovascular:      Rate and Rhythm: Normal rate and regular rhythm.      Pulses: Normal pulses.      Heart sounds: Normal heart sounds.   Pulmonary:      Effort: Pulmonary effort is normal.      Breath sounds: Normal breath sounds.   Musculoskeletal:         General: Normal range of motion.   Skin:     General: Skin is warm and dry.      Capillary Refill: Capillary refill takes less than 2 seconds.   Neurological:      General: No focal deficit present.      Mental Status: He is alert and oriented to person, place, and time. Mental status is at baseline.   Psychiatric:         Mood and Affect: Mood normal.         Behavior: Behavior normal.         Thought Content: Thought content normal.         Judgment: Judgment normal.                Assessment & Plan     1. STEVE on CPAP  EKG   2. Pre-diabetes  EKG   3. Agatston coronary artery calcium score less than 100  EKG    CT-CARDIAC SCORING   4. Ascending aortic aneurysm (HCC)  EKG       Medical Decision Making: Today's " Assessment/Status/Plan:      1. CAD with CT scoring (LAD score of 30)  -asymptomatic, no angina or BALDWIN  -cont lifestyle modifications with dietary/exercise modifications  -repeat CT scoring for evaluation of coronary disease progression in March 2022  -consider ASA, statin for HLD management if progression occurs    2. Pre-diabetes with obesity  -again work on weight loss and dietary/exercise  -follow with PCP    3. STEVE on CPAP  -compliant and tolerating    4. Ascending aortic dilation (4.1 cm)  -stable on CT imaging in '19  -repeat imaging in '22  -BP controlled    Patient is to follow up with Dr. Diaz in 1 year with CT scoring and annual labs.

## 2021-10-06 NOTE — LETTER
PROCEDURE/SURGERY CLEARANCE FORM      Encounter Date: 10/6/2021    Patient: Christofer Herrera  YOB: 1960    CARDIOLOGIST:  Lindsey ROWELL    REFERRING DOCTOR:  Roshan Ibrahim MD    The above patient may proceed with the following procedure/surgery: cholecystectomy                 Lindsey ROWELL

## 2021-10-06 NOTE — TELEPHONE ENCOUNTER
----- Message from PORSHA Hughes sent at 10/5/2021  4:10 PM PDT -----  Regarding: surgery clearance  Surgery clearance, ok to proceed. No afib on EKG.    Dr. Ibrahim [facility]    Procedure on monday

## 2021-10-11 ENCOUNTER — ANESTHESIA EVENT (OUTPATIENT)
Dept: SURGERY | Facility: MEDICAL CENTER | Age: 61
End: 2021-10-11
Payer: COMMERCIAL

## 2021-10-11 ENCOUNTER — HOSPITAL ENCOUNTER (OUTPATIENT)
Facility: MEDICAL CENTER | Age: 61
End: 2021-10-11
Attending: SURGERY | Admitting: SURGERY
Payer: COMMERCIAL

## 2021-10-11 ENCOUNTER — ANESTHESIA (OUTPATIENT)
Dept: SURGERY | Facility: MEDICAL CENTER | Age: 61
End: 2021-10-11
Payer: COMMERCIAL

## 2021-10-11 VITALS
DIASTOLIC BLOOD PRESSURE: 78 MMHG | BODY MASS INDEX: 33.05 KG/M2 | OXYGEN SATURATION: 95 % | HEIGHT: 70 IN | SYSTOLIC BLOOD PRESSURE: 144 MMHG | TEMPERATURE: 97.9 F | HEART RATE: 69 BPM | RESPIRATION RATE: 16 BRPM | WEIGHT: 230.82 LBS

## 2021-10-11 DIAGNOSIS — G89.18 POSTOPERATIVE PAIN: ICD-10-CM

## 2021-10-11 LAB — PATHOLOGY CONSULT NOTE: NORMAL

## 2021-10-11 PROCEDURE — 501838 HCHG SUTURE GENERAL: Performed by: SURGERY

## 2021-10-11 PROCEDURE — 160035 HCHG PACU - 1ST 60 MINS PHASE I: Performed by: SURGERY

## 2021-10-11 PROCEDURE — 700101 HCHG RX REV CODE 250: Performed by: ANESTHESIOLOGY

## 2021-10-11 PROCEDURE — A9270 NON-COVERED ITEM OR SERVICE: HCPCS | Performed by: ANESTHESIOLOGY

## 2021-10-11 PROCEDURE — 160009 HCHG ANES TIME/MIN: Performed by: SURGERY

## 2021-10-11 PROCEDURE — 88304 TISSUE EXAM BY PATHOLOGIST: CPT

## 2021-10-11 PROCEDURE — 700101 HCHG RX REV CODE 250: Performed by: SURGERY

## 2021-10-11 PROCEDURE — 501572 HCHG TROCAR, SHIELD OBTU 5X100: Performed by: SURGERY

## 2021-10-11 PROCEDURE — 501568 HCHG TROCAR, BLUNTPORT 12MM: Performed by: SURGERY

## 2021-10-11 PROCEDURE — 700105 HCHG RX REV CODE 258: Performed by: ANESTHESIOLOGY

## 2021-10-11 PROCEDURE — 700111 HCHG RX REV CODE 636 W/ 250 OVERRIDE (IP): Performed by: ANESTHESIOLOGY

## 2021-10-11 PROCEDURE — 160046 HCHG PACU - 1ST 60 MINS PHASE II: Performed by: SURGERY

## 2021-10-11 PROCEDURE — 500002 HCHG ADHESIVE, DERMABOND: Performed by: SURGERY

## 2021-10-11 PROCEDURE — 501583 HCHG TROCAR, THRD CAN&SEAL 5X100: Performed by: SURGERY

## 2021-10-11 PROCEDURE — 160025 RECOVERY II MINUTES (STATS): Performed by: SURGERY

## 2021-10-11 PROCEDURE — 700105 HCHG RX REV CODE 258: Performed by: SURGERY

## 2021-10-11 PROCEDURE — 160002 HCHG RECOVERY MINUTES (STAT): Performed by: SURGERY

## 2021-10-11 PROCEDURE — 160041 HCHG SURGERY MINUTES - EA ADDL 1 MIN LEVEL 4: Performed by: SURGERY

## 2021-10-11 PROCEDURE — 502571 HCHG PACK, LAP CHOLE: Performed by: SURGERY

## 2021-10-11 PROCEDURE — 500800 HCHG LAPAROSCOPIC J/L HOOK: Performed by: SURGERY

## 2021-10-11 PROCEDURE — 501570 HCHG TROCAR, SEPARATOR: Performed by: SURGERY

## 2021-10-11 PROCEDURE — 500514 HCHG ENDOCLIP: Performed by: SURGERY

## 2021-10-11 PROCEDURE — A9270 NON-COVERED ITEM OR SERVICE: HCPCS

## 2021-10-11 PROCEDURE — 160029 HCHG SURGERY MINUTES - 1ST 30 MINS LEVEL 4: Performed by: SURGERY

## 2021-10-11 PROCEDURE — 700102 HCHG RX REV CODE 250 W/ 637 OVERRIDE(OP): Performed by: ANESTHESIOLOGY

## 2021-10-11 PROCEDURE — 700102 HCHG RX REV CODE 250 W/ 637 OVERRIDE(OP)

## 2021-10-11 PROCEDURE — 160048 HCHG OR STATISTICAL LEVEL 1-5: Performed by: SURGERY

## 2021-10-11 RX ORDER — OXYCODONE HCL 10 MG/1
10 TABLET, FILM COATED, EXTENDED RELEASE ORAL ONCE
Status: COMPLETED | OUTPATIENT
Start: 2021-10-11 | End: 2021-10-11

## 2021-10-11 RX ORDER — ACETAMINOPHEN 325 MG/1
650 TABLET ORAL EVERY 6 HOURS
Qty: 60 TABLET | Refills: 0 | Status: SHIPPED | OUTPATIENT
Start: 2021-10-11 | End: 2022-06-06

## 2021-10-11 RX ORDER — MEPERIDINE HYDROCHLORIDE 25 MG/ML
12.5 INJECTION INTRAMUSCULAR; INTRAVENOUS; SUBCUTANEOUS
Status: DISCONTINUED | OUTPATIENT
Start: 2021-10-11 | End: 2021-10-11 | Stop reason: HOSPADM

## 2021-10-11 RX ORDER — MIDAZOLAM HYDROCHLORIDE 1 MG/ML
1 INJECTION INTRAMUSCULAR; INTRAVENOUS
Status: DISCONTINUED | OUTPATIENT
Start: 2021-10-11 | End: 2021-10-11 | Stop reason: HOSPADM

## 2021-10-11 RX ORDER — LIDOCAINE HYDROCHLORIDE 20 MG/ML
INJECTION, SOLUTION EPIDURAL; INFILTRATION; INTRACAUDAL; PERINEURAL PRN
Status: DISCONTINUED | OUTPATIENT
Start: 2021-10-11 | End: 2021-10-11 | Stop reason: SURG

## 2021-10-11 RX ORDER — OXYCODONE HCL 5 MG/5 ML
5 SOLUTION, ORAL ORAL
Status: COMPLETED | OUTPATIENT
Start: 2021-10-11 | End: 2021-10-11

## 2021-10-11 RX ORDER — SUCCINYLCHOLINE CHLORIDE 20 MG/ML
INJECTION INTRAMUSCULAR; INTRAVENOUS PRN
Status: DISCONTINUED | OUTPATIENT
Start: 2021-10-11 | End: 2021-10-11 | Stop reason: SURG

## 2021-10-11 RX ORDER — CELECOXIB 200 MG/1
CAPSULE ORAL
Status: COMPLETED
Start: 2021-10-11 | End: 2021-10-11

## 2021-10-11 RX ORDER — DIPHENHYDRAMINE HYDROCHLORIDE 50 MG/ML
12.5 INJECTION INTRAMUSCULAR; INTRAVENOUS
Status: DISCONTINUED | OUTPATIENT
Start: 2021-10-11 | End: 2021-10-11 | Stop reason: HOSPADM

## 2021-10-11 RX ORDER — HYDROMORPHONE HYDROCHLORIDE 1 MG/ML
0.1 INJECTION, SOLUTION INTRAMUSCULAR; INTRAVENOUS; SUBCUTANEOUS
Status: DISCONTINUED | OUTPATIENT
Start: 2021-10-11 | End: 2021-10-11 | Stop reason: HOSPADM

## 2021-10-11 RX ORDER — KETOROLAC TROMETHAMINE 30 MG/ML
INJECTION, SOLUTION INTRAMUSCULAR; INTRAVENOUS PRN
Status: DISCONTINUED | OUTPATIENT
Start: 2021-10-11 | End: 2021-10-11 | Stop reason: SURG

## 2021-10-11 RX ORDER — GABAPENTIN 300 MG/1
600 CAPSULE ORAL ONCE
Status: COMPLETED | OUTPATIENT
Start: 2021-10-11 | End: 2021-10-11

## 2021-10-11 RX ORDER — CELECOXIB 200 MG/1
200 CAPSULE ORAL ONCE
Status: COMPLETED | OUTPATIENT
Start: 2021-10-11 | End: 2021-10-11

## 2021-10-11 RX ORDER — ONDANSETRON 2 MG/ML
4 INJECTION INTRAMUSCULAR; INTRAVENOUS
Status: DISCONTINUED | OUTPATIENT
Start: 2021-10-11 | End: 2021-10-11 | Stop reason: HOSPADM

## 2021-10-11 RX ORDER — HALOPERIDOL 5 MG/ML
1 INJECTION INTRAMUSCULAR
Status: DISCONTINUED | OUTPATIENT
Start: 2021-10-11 | End: 2021-10-11 | Stop reason: HOSPADM

## 2021-10-11 RX ORDER — HYDROMORPHONE HYDROCHLORIDE 1 MG/ML
0.4 INJECTION, SOLUTION INTRAMUSCULAR; INTRAVENOUS; SUBCUTANEOUS
Status: DISCONTINUED | OUTPATIENT
Start: 2021-10-11 | End: 2021-10-11 | Stop reason: HOSPADM

## 2021-10-11 RX ORDER — SODIUM CHLORIDE, SODIUM LACTATE, POTASSIUM CHLORIDE, CALCIUM CHLORIDE 600; 310; 30; 20 MG/100ML; MG/100ML; MG/100ML; MG/100ML
INJECTION, SOLUTION INTRAVENOUS CONTINUOUS
Status: DISCONTINUED | OUTPATIENT
Start: 2021-10-11 | End: 2021-10-11 | Stop reason: HOSPADM

## 2021-10-11 RX ORDER — DIPHENHYDRAMINE HCL 25 MG
25 TABLET ORAL EVERY 6 HOURS PRN
Status: DISCONTINUED | OUTPATIENT
Start: 2021-10-11 | End: 2021-10-11 | Stop reason: HOSPADM

## 2021-10-11 RX ORDER — HYDROMORPHONE HYDROCHLORIDE 1 MG/ML
0.2 INJECTION, SOLUTION INTRAMUSCULAR; INTRAVENOUS; SUBCUTANEOUS
Status: DISCONTINUED | OUTPATIENT
Start: 2021-10-11 | End: 2021-10-11 | Stop reason: HOSPADM

## 2021-10-11 RX ORDER — CEFAZOLIN SODIUM 1 G/3ML
INJECTION, POWDER, FOR SOLUTION INTRAMUSCULAR; INTRAVENOUS PRN
Status: DISCONTINUED | OUTPATIENT
Start: 2021-10-11 | End: 2021-10-11 | Stop reason: SURG

## 2021-10-11 RX ORDER — OXYCODONE HYDROCHLORIDE 5 MG/1
5 TABLET ORAL EVERY 4 HOURS PRN
Qty: 12 TABLET | Refills: 0 | Status: SHIPPED | OUTPATIENT
Start: 2021-10-11 | End: 2021-10-14

## 2021-10-11 RX ORDER — ONDANSETRON 2 MG/ML
INJECTION INTRAMUSCULAR; INTRAVENOUS PRN
Status: DISCONTINUED | OUTPATIENT
Start: 2021-10-11 | End: 2021-10-11 | Stop reason: SURG

## 2021-10-11 RX ORDER — BUPIVACAINE HYDROCHLORIDE AND EPINEPHRINE 5; 5 MG/ML; UG/ML
INJECTION, SOLUTION EPIDURAL; INTRACAUDAL; PERINEURAL
Status: DISCONTINUED | OUTPATIENT
Start: 2021-10-11 | End: 2021-10-11 | Stop reason: HOSPADM

## 2021-10-11 RX ORDER — DIPHENHYDRAMINE HYDROCHLORIDE 50 MG/ML
25 INJECTION INTRAMUSCULAR; INTRAVENOUS EVERY 6 HOURS PRN
Status: DISCONTINUED | OUTPATIENT
Start: 2021-10-11 | End: 2021-10-11 | Stop reason: HOSPADM

## 2021-10-11 RX ORDER — ACETAMINOPHEN 500 MG
1000 TABLET ORAL ONCE
Status: COMPLETED | OUTPATIENT
Start: 2021-10-11 | End: 2021-10-11

## 2021-10-11 RX ORDER — ROCURONIUM BROMIDE 10 MG/ML
INJECTION, SOLUTION INTRAVENOUS PRN
Status: DISCONTINUED | OUTPATIENT
Start: 2021-10-11 | End: 2021-10-11 | Stop reason: SURG

## 2021-10-11 RX ORDER — HYDRALAZINE HYDROCHLORIDE 20 MG/ML
5 INJECTION INTRAMUSCULAR; INTRAVENOUS
Status: DISCONTINUED | OUTPATIENT
Start: 2021-10-11 | End: 2021-10-11 | Stop reason: HOSPADM

## 2021-10-11 RX ORDER — OXYCODONE HCL 5 MG/5 ML
10 SOLUTION, ORAL ORAL
Status: COMPLETED | OUTPATIENT
Start: 2021-10-11 | End: 2021-10-11

## 2021-10-11 RX ORDER — DOCUSATE SODIUM 100 MG/1
100 CAPSULE, LIQUID FILLED ORAL 2 TIMES DAILY
Qty: 60 CAPSULE | Refills: 0 | Status: SHIPPED | OUTPATIENT
Start: 2021-10-11 | End: 2022-06-06

## 2021-10-11 RX ORDER — LABETALOL HYDROCHLORIDE 5 MG/ML
5 INJECTION, SOLUTION INTRAVENOUS
Status: DISCONTINUED | OUTPATIENT
Start: 2021-10-11 | End: 2021-10-11 | Stop reason: HOSPADM

## 2021-10-11 RX ORDER — SODIUM CHLORIDE, SODIUM LACTATE, POTASSIUM CHLORIDE, CALCIUM CHLORIDE 600; 310; 30; 20 MG/100ML; MG/100ML; MG/100ML; MG/100ML
INJECTION, SOLUTION INTRAVENOUS
Status: DISCONTINUED | OUTPATIENT
Start: 2021-10-11 | End: 2021-10-11 | Stop reason: SURG

## 2021-10-11 RX ORDER — METOPROLOL TARTRATE 1 MG/ML
1 INJECTION, SOLUTION INTRAVENOUS
Status: DISCONTINUED | OUTPATIENT
Start: 2021-10-11 | End: 2021-10-11 | Stop reason: HOSPADM

## 2021-10-11 RX ORDER — SODIUM CHLORIDE, SODIUM LACTATE, POTASSIUM CHLORIDE, CALCIUM CHLORIDE 600; 310; 30; 20 MG/100ML; MG/100ML; MG/100ML; MG/100ML
INJECTION, SOLUTION INTRAVENOUS CONTINUOUS
Status: ACTIVE | OUTPATIENT
Start: 2021-10-11 | End: 2021-10-11

## 2021-10-11 RX ADMIN — CEFAZOLIN 2 G: 330 INJECTION, POWDER, FOR SOLUTION INTRAMUSCULAR; INTRAVENOUS at 09:10

## 2021-10-11 RX ADMIN — FENTANYL CITRATE 100 MCG: 50 INJECTION, SOLUTION INTRAMUSCULAR; INTRAVENOUS at 09:06

## 2021-10-11 RX ADMIN — FENTANYL CITRATE 100 MCG: 50 INJECTION, SOLUTION INTRAMUSCULAR; INTRAVENOUS at 09:10

## 2021-10-11 RX ADMIN — OXYCODONE HYDROCHLORIDE 5 MG: 5 SOLUTION ORAL at 10:23

## 2021-10-11 RX ADMIN — PROPOFOL 200 MG: 10 INJECTION, EMULSION INTRAVENOUS at 09:10

## 2021-10-11 RX ADMIN — LIDOCAINE HYDROCHLORIDE 100 MG: 20 INJECTION, SOLUTION EPIDURAL; INFILTRATION; INTRACAUDAL; PERINEURAL at 09:10

## 2021-10-11 RX ADMIN — ONDANSETRON 4 MG: 2 INJECTION INTRAMUSCULAR; INTRAVENOUS at 09:42

## 2021-10-11 RX ADMIN — KETOROLAC TROMETHAMINE 30 MG: 30 INJECTION, SOLUTION INTRAMUSCULAR at 09:42

## 2021-10-11 RX ADMIN — OXYCODONE HYDROCHLORIDE 10 MG: 10 TABLET, FILM COATED, EXTENDED RELEASE ORAL at 08:55

## 2021-10-11 RX ADMIN — CELECOXIB 200 MG: 200 CAPSULE ORAL at 08:55

## 2021-10-11 RX ADMIN — GABAPENTIN 600 MG: 300 CAPSULE ORAL at 08:55

## 2021-10-11 RX ADMIN — LIDOCAINE HYDROCHLORIDE 0.5 ML: 10 INJECTION, SOLUTION INFILTRATION; PERINEURAL at 07:18

## 2021-10-11 RX ADMIN — FENTANYL CITRATE 25 MCG: 50 INJECTION, SOLUTION INTRAMUSCULAR; INTRAVENOUS at 10:23

## 2021-10-11 RX ADMIN — SODIUM CHLORIDE, POTASSIUM CHLORIDE, SODIUM LACTATE AND CALCIUM CHLORIDE: 600; 310; 30; 20 INJECTION, SOLUTION INTRAVENOUS at 07:18

## 2021-10-11 RX ADMIN — ROCURONIUM BROMIDE 50 MG: 10 INJECTION, SOLUTION INTRAVENOUS at 09:10

## 2021-10-11 RX ADMIN — FENTANYL CITRATE 50 MCG: 50 INJECTION, SOLUTION INTRAMUSCULAR; INTRAVENOUS at 09:12

## 2021-10-11 RX ADMIN — ACETAMINOPHEN 1000 MG: 500 TABLET, FILM COATED ORAL at 08:55

## 2021-10-11 RX ADMIN — FENTANYL CITRATE 25 MCG: 50 INJECTION, SOLUTION INTRAMUSCULAR; INTRAVENOUS at 10:32

## 2021-10-11 RX ADMIN — EPHEDRINE SULFATE 10 MG: 50 INJECTION INTRAMUSCULAR; INTRAVENOUS; SUBCUTANEOUS at 09:32

## 2021-10-11 RX ADMIN — SUGAMMADEX 200 MG: 100 INJECTION, SOLUTION INTRAVENOUS at 09:52

## 2021-10-11 RX ADMIN — SUCCINYLCHOLINE CHLORIDE 200 MG: 20 INJECTION, SOLUTION INTRAMUSCULAR; INTRAVENOUS at 09:10

## 2021-10-11 RX ADMIN — SODIUM CHLORIDE, POTASSIUM CHLORIDE, SODIUM LACTATE AND CALCIUM CHLORIDE: 600; 310; 30; 20 INJECTION, SOLUTION INTRAVENOUS at 09:06

## 2021-10-11 ASSESSMENT — PAIN DESCRIPTION - PAIN TYPE
TYPE: CHRONIC PAIN
TYPE: SURGICAL PAIN

## 2021-10-11 ASSESSMENT — PAIN SCALES - GENERAL: PAIN_LEVEL: 2

## 2021-10-11 ASSESSMENT — FIBROSIS 4 INDEX: FIB4 SCORE: 0.89

## 2021-10-11 NOTE — OP REPORT
Operative Report    Date: 10/11/2021    Surgeon: Roshan Ibrahim M.D.     Assistant: SONNY Salazar    Pre-operative Diagnosis: Symptomatic Cholelithiasis    Post-operative Diagnosis: same    ASA Classification: II.    Procedure: Laparoscopic cholecystectomy    Indications: This is a 61 y.o. male who presented with symptoms of right upper quadrant pain.      The indications for a surgical assistant in this surgery were indicated due to complexity of the procedure. Their role included aiding in incision, retraction, holding devices including camera for laparoscopic procedure, and closure of the wound.      Wound Classification: Class II, II, Clean Contaminated..    Findings: critical view of safety obtained    Procedure in detail: The patient was seen and examined in the preoperative holding area.  The risks benefits and alternatives of the procedure were discussed with the patient who wished to proceed with the procedure as described.  The patient was transferred to the operating room placed in supine position and all pressure points were properly padded.  General endotracheal anesthesia was induced and preoperative antibiotics were given per SCIP protocol.  Patient's abdomen was prepped with ChloraPrep and draped in the normal sterile fashion.  A timeout was performed confirming correct patient, correct procedure, and that all necessary equipment was in the room.      After infiltration of local anesthetic, an incision was made in the supraumbilical position.  A combination of blunt and electrocautery dissection was used down to the fascia.  An umbilical hernia was identified at the umbilical stalk.  The hernia sac was dissected free from the fascial defect and the fascial opening was used to insert the Saleem port.  2 separate 0 vicryl figure-of-eight sutures were placed around the hernia defect before placing a Saleem port. The Saleem port was introduced and pneumoperitoneum was achieved and maintained  at 15 mmHg carbon dioxide throughout the entirety of the case.   The 5 mm camera was introduced and the abdomen was inspected and no underlying trauma was identified from abdominal entry. After infusing local anesthetic under direct visualization two 5 mm right upper quadrant and one 5 mm epigastric port were placed.    The gallbladder was identified in the right upper quadrant.  A combination of blunt and electrocautery dissection were used to dissect the overlaying tissue free from around the cystic duct and cystic artery.  Dissection was continued until the cystic duct and cystic artery free and there is nothing but liver parenchyma behind.  This confirmed the critical view of safety. The cystic duct and cystic artery were double clipped on the patient's side single clipped on the specimen side and sharply transected. The gallbladder was removed from the cystic plate using electrocautery.  I inspected the cystic plate and found it to be hemostatic.  The gallbladder was placed in a 10 mm Endo Catch bag through the supraumbilical port and removed.    All ports were removed with video assist, and were hemostatic. The previously placed vicryl suture were tied. All skin sites were closed with subcuticular Monocryl and Dermabond was placed over the wounds.    The patient was awakened from general anesthetic, and was taken to the recovery room in stable condition.    Sponge and needle counts were correct at the end of the case.     Specimen: gallbladder and content for permanent pathology    EBL: 25mL    Dispo: stable, extubated, to PACU    Roshan Ibrahim M.D.  [facility]  [phone]

## 2021-10-11 NOTE — ANESTHESIA PROCEDURE NOTES
Airway    Date/Time: 10/11/2021 9:10 AM  Performed by: Wenceslao Bhatti D.O.  Authorized by: Wenceslao Bhatti D.O.     Location:  OR  Urgency:  Elective  Indications for Airway Management:  Anesthesia      Spontaneous Ventilation: absent    Sedation Level:  Deep  Preoxygenated: Yes    Patient Position:  Sniffing  Mask Difficulty Assessment:  0 - not attempted  Final Airway Type:  Endotracheal airway  Final Endotracheal Airway:  ETT  Cuffed: Yes    Technique Used for Successful ETT Placement:  Direct laryngoscopy    Insertion Site:  Oral  Blade Type:  Karo  Laryngoscope Blade/Videolaryngoscope Blade Size:  4  ETT Size (mm):  8.0  Measured from:  Teeth  ETT to Teeth (cm):  25  Placement Verified by: auscultation and capnometry    Cormack-Lehane Classification:  Grade IIa - partial view of glottis  Number of Attempts at Approach:  1

## 2021-10-11 NOTE — OR NURSING
Pt allergies and NPO status verified, home meds reconcilled. Belongings secured. Pt verbalizes understanding of the pain scale, expected course of stay, and plan of care.  Surgical site verified with pt.  IV access established.  Sequentials placed on legs.

## 2021-10-11 NOTE — ANESTHESIA POSTPROCEDURE EVALUATION
Patient: Christofer Herrera    Procedure Summary     Date: 10/11/21 Room / Location:  OR  / SURGERY [facility]    Anesthesia Start: 0906 Anesthesia Stop: 0959    Procedure: CHOLECYSTECTOMY, LAPAROSCOPIC (Abdomen) Diagnosis:       Cholelithiasis without obstruction      (CHOLELITHIASIS WITHOUT OBSTRUCTION)    Surgeons: Roshan Ibrahim M.D. Responsible Provider: Wenceslao Bhatti D.O.    Anesthesia Type: general ASA Status: 2          Final Anesthesia Type: general  Last vitals  BP   Blood Pressure: 144/79    Temp   36.6 °C (97.9 °F)    Pulse   60   Resp   16    SpO2   92 %      Anesthesia Post Evaluation    Patient location during evaluation: PACU  Patient participation: complete - patient participated  Level of consciousness: awake and alert  Pain score: 2    Airway patency: patent  Anesthetic complications: no  Cardiovascular status: hemodynamically stable  Respiratory status: acceptable  Hydration status: euvolemic    PONV: none          No complications documented.     Nurse Pain Score: 3 (NPRS)

## 2021-10-11 NOTE — OR NURSING
1107: To stage ll. Pain is tolerable, no nausea.  1127: Home care instructions reviewed w/ pt and son. No questions, meets criteria for discharge.

## 2021-10-11 NOTE — OR NURSING
1012: care assumed of drowsy pt c/o 5/10 pain - plan analgesia. Placed on own CPAP at home settings.  1020: Tolerating po water  1035: pain decreasing, Taking po juice per ERAS orders  1043: CPAP use d/gonzález.Pt states pain now tolerable.  1050: No change  1058: Meets criteria to transfer to Stage 2.

## 2021-10-11 NOTE — DISCHARGE INSTRUCTIONS
Laparoscopic Cholecystectomy D/C instructions:    DIET: Upon discharge from the hospital you may resume your normal preoperative diet. Depending on how you are feeling and whether you have nausea or not, you may wish to stay with a bland diet for the first few days. However, you can advance this as quickly as you feel ready.    ACTIVITIES: After discharge from the hospital, you may resume full routine activities. However, there should be no heavy lifting (greater than 15 pounds) and no strenuous activities until after your follow-up visit. Otherwise, routine activities of daily living are acceptable.    DRIVING: You may drive whenever you are off pain medications and are able to perform the activities needed to drive, i.e. turning, bending, twisting, etc.    BATHING: You may get the wound wet at any time after leaving the hospital. You may shower, but do not submerge in a bath for at least a week. Dressings may come off after 48 hours.    BOWEL FUNCTION: A few patients, after this operation, will develop either frequent or loose stools after meals. This usually corrects itself after a few days, to a few weeks. If this occurs, do not worry; it is not unusual and will resolve. Much more common than loose stools, is constipation. The combination of pain medication and decreased activity level can cause constipation in otherwise normal patients. If you feel this is occurring, take a laxative (Milk of Magnesia, Ex-Lax, Senokot, etc.) until the problem has resolved.    PAIN MEDICATION: You will be given a prescription for pain medication at discharge. Please take these as directed. It is important to remember not to take medications on an empty stomach as this may cause nausea.    CALL IF YOU HAVE: (1) Fevers to more than 1010 F, (2) Unusual chest or leg pain, (3) Drainage or fluid from incision that may be foul smelling, increased tenderness or soreness at the wound or the wound edges are no longer together, redness or  swelling at the incision site. Please do not hesitate to call with any other questions.     APPOINTMENT: Contact our office at [phone] for a follow-up appointment in 1 to 2 weeks following your procedure.    If you have any additional questions, please do not hesitate to call the office.    Office address:  [address], [address] [place] [address]          ACTIVITY: Rest and take it easy for the first 24 hours.  A responsible adult is recommended to remain with you during that time.  It is normal to feel sleepy.  We encourage you to not do anything that requires balance, judgment or coordination.    MILD FLU-LIKE SYMPTOMS ARE NORMAL. YOU MAY EXPERIENCE GENERALIZED MUSCLE ACHES, THROAT IRRITATION, HEADACHE AND/OR SOME NAUSEA.    FOR 24 HOURS DO NOT:  Drive, operate machinery or run household appliances.  Drink beer or alcoholic beverages.   Make important decisions or sign legal documents.    DIET: To avoid nausea, slowly advance diet as tolerated, avoiding spicy or greasy foods for the first day.  Add more substantial food to your diet according to your physician's instructions. INCREASE FLUIDS AND FIBER TO AVOID CONSTIPATION.    You should CALL YOUR PHYSICIAN if you develop:  Fever greater than 101 degrees F.  Pain not relieved by medication, or persistent nausea or vomiting.  Excessive bleeding (blood soaking through dressing) or unexpected drainage from the wound.  Extreme redness or swelling around the incision site, drainage of pus or foul smelling drainage.  Inability to urinate or empty your bladder within 8 hours.  Problems with breathing or chest pain.    You should call 911 if you develop problems with breathing or chest pain.  If you are unable to contact your doctor or surgical center, you should go to the nearest emergency room or urgent care center.  Physician's telephone #: [phone]    If any questions arise, call your doctor.  If your doctor is not available, please feel free to call the  Surgical Center at [phone]. The Contact Center is open Monday through Friday 7AM to 5PM and may speak to a nurse at [phone], or toll free at [phone].     A registered nurse may call you a few days after your surgery to see how you are doing after your procedure.    MEDICATIONS: Resume taking daily medication.  Take prescribed pain medication with food.  If no medication is prescribed, you may take non-aspirin pain medication if needed.  PAIN MEDICATION CAN BE VERY CONSTIPATING.  Take a stool softener or laxative such as senokot, pericolace, or milk of magnesia if needed.    Prescription given pre-op.  Last pain medication given at 10:23 a.m. (5 mg Oxycodone)    If your physician has prescribed pain medication that includes Acetaminophen (Tylenol), do not take additional Acetaminophen (Tylenol) while taking the prescribed medication.    Depression / Suicide Risk    As you are discharged from this [facility] facility, it is important to learn how to keep safe from harming yourself.    Recognize the warning signs:  · Abrupt changes in personality, positive or negative- including increase in energy   · Giving away possessions  · Change in eating patterns- significant weight changes-  positive or negative  · Change in sleeping patterns- unable to sleep or sleeping all the time   · Unwillingness or inability to communicate  · Depression  · Unusual sadness, discouragement and loneliness  · Talk of wanting to die  · Neglect of personal appearance   · Rebelliousness- reckless behavior  · Withdrawal from people/activities they love  · Confusion- inability to concentrate     If you or a loved one observes any of these behaviors or has concerns about self-harm, here's what you can do:  · Talk about it- your feelings and reasons for harming yourself  · Remove any means that you might use to hurt yourself (examples: pills, rope, extension cords, firearm)  · Get professional help from the community (Mental  Health, Substance Abuse, psychological counseling)  · Do not be alone:Call your Safe Contact- someone whom you trust who will be there for you.  · Call your local CRISIS HOTLINE 434-2039 or 947-191-9107  · Call your local Children's Mobile Crisis Response Team [place] (897) 268-3379 or www.[employer]  · Call the toll free National Suicide Prevention Hotlines   · National Suicide Prevention Lifeline 965-643-EWIA (4069)  [facility] Line Network 800-SUICIDE (971-1154)    Discharge Education for patients on STEVE (Obstructive Sleep Apnea) Protocol    Prior to receiving sedation or anesthesia, we screen all patients for Obstructive Sleep Apnea.  During your screening, you were identified as having  Obstructive Sleep Apnea(STEVE).    What is Obstructive Sleep Apnea?  Sleep apnea (AP-ne-ah) is a common disorder which involves breathing pauses that occur during sleep.  These can last from 10 seconds to a minute or longer.  Normal breathing resumes often with a loud snort or choking sound.    Sleep apnea occurs in all age groups and both genders but is more common in men and people over 40 years of age.  It has been estimated that as many as 18 million Americans have sleep apnea.  Most people who have sleep apnea don’t know they have it because it only occurs during sleep.  A family member and/or bed partner may first notice the signs of sleep apnea.  Sleep apnea is a chronic (ongoing) condition that disrupts the quality and quantity of your sleep repeatedly throughout the night.  This often results in excessive daytime sleepiness or fatigue during the day.  It may also contribute to high blood pressure, heart problems, and complications following medications used for surgery and procedures.     We recommend that you should be with an adult observer for at least 24 hours after your sedation/anesthesia.  If you have a CPAP machine, you should wear it during any sleep period (day or night) for the week following  your procedure.  We encourage you to sleep on your side or in a sitting position, even with napping.  Lying flat on your back increases the risk of apnea and airway obstruction during your post procedure recovery period.    It is important to prevent over-sedation that could increase your risk for apnea.  Please take all pain medication as directed by your physician.  If you are not getting pain relief, please contact your physician to discuss possible approaches to relieving pain while minimizing medications that can affect your breathing and oxygen levels.

## 2021-10-11 NOTE — OR NURSING
0958- Patient arrived from OR post CHOLECYSTECTOMY, LAPAROSCOPIC. Respirations spontaneous and unlabored via OPA. VSS, see flowsheets except for noted hypertension. Dr. Bhatti aware. PRN orders present. Lap sites x 4 to abdomen C/D/I closed with dermabond. Dressing over umbilicus dressed with gauze and tegaderm C/D/I.   1009- Patient more awake, reporting pain to umbilicus, currently 6-7/10.   1012- Report to LAVON Arzola.

## 2021-10-11 NOTE — ANESTHESIA PREPROCEDURE EVALUATION
Relevant Problems   ANESTHESIA   (positive) STEVE on CPAP      NEURO   (positive) History of prostate cancer      CARDIAC   (positive) Ascending aortic aneurysm (HCC)      Other   (positive) Chronic low back pain with sciatica   (positive) History of spinal fusion   (positive) Pre-diabetes       Physical Exam    Airway   Mallampati: II  TM distance: >3 FB  Neck ROM: full       Cardiovascular - normal exam  Rhythm: regular  Rate: normal  (-) murmur     Dental - normal exam           Pulmonary - normal exam  Breath sounds clear to auscultation     Abdominal    Neurological - normal exam                 Anesthesia Plan    ASA 2       Plan - general       Airway plan will be ETT          Induction: intravenous    Postoperative Plan: Postoperative administration of opioids is intended.    Pertinent diagnostic labs and testing reviewed    Informed Consent:    Anesthetic plan and risks discussed with patient.    Use of blood products discussed with: patient whom consented to blood products.

## 2021-10-11 NOTE — ANESTHESIA TIME REPORT
Anesthesia Start and Stop Event Times     Date Time Event    10/11/2021 0900 Ready for Procedure     0906 Anesthesia Start     0959 Anesthesia Stop        Responsible Staff  10/11/21    Name Role Begin End    Wenceslao Bhatti D.O. Anesth 0906 0959        Preop Diagnosis (Free Text):  Pre-op Diagnosis     CHOLELITHIASIS WITHOUT OBSTRUCTION        Preop Diagnosis (Codes):  Diagnosis Information     Diagnosis Code(s): Cholelithiasis without obstruction [K80.20]        Premium Reason  Non-Premium    Comments:

## 2021-10-23 ENCOUNTER — HOSPITAL ENCOUNTER (OUTPATIENT)
Dept: RADIOLOGY | Facility: MEDICAL CENTER | Age: 61
End: 2021-10-23
Attending: INTERNAL MEDICINE
Payer: COMMERCIAL

## 2021-10-23 DIAGNOSIS — R93.1 AGATSTON CORONARY ARTERY CALCIUM SCORE LESS THAN 100: ICD-10-CM

## 2021-10-23 PROCEDURE — 4410556 CT-CARDIAC SCORING (SELF PAY ONLY)

## 2021-11-09 ENCOUNTER — APPOINTMENT (OUTPATIENT)
Dept: RADIOLOGY | Facility: MEDICAL CENTER | Age: 61
End: 2021-11-09
Attending: EMERGENCY MEDICINE
Payer: COMMERCIAL

## 2021-11-09 ENCOUNTER — HOSPITAL ENCOUNTER (EMERGENCY)
Facility: MEDICAL CENTER | Age: 61
End: 2021-11-09
Attending: EMERGENCY MEDICINE | Admitting: EMERGENCY MEDICINE
Payer: COMMERCIAL

## 2021-11-09 VITALS
HEART RATE: 82 BPM | DIASTOLIC BLOOD PRESSURE: 82 MMHG | BODY MASS INDEX: 33.59 KG/M2 | WEIGHT: 237.44 LBS | OXYGEN SATURATION: 94 % | TEMPERATURE: 97.9 F | RESPIRATION RATE: 18 BRPM | SYSTOLIC BLOOD PRESSURE: 142 MMHG

## 2021-11-09 DIAGNOSIS — S02.85XA CLOSED FRACTURE OF ORBIT, INITIAL ENCOUNTER (HCC): ICD-10-CM

## 2021-11-09 PROCEDURE — 99283 EMERGENCY DEPT VISIT LOW MDM: CPT

## 2021-11-09 PROCEDURE — 70486 CT MAXILLOFACIAL W/O DYE: CPT

## 2021-11-09 ASSESSMENT — FIBROSIS 4 INDEX: FIB4 SCORE: 0.89

## 2021-11-09 NOTE — ED TRIAGE NOTES
Pt ambs to triage  Chief Complaint   Patient presents with   • Eye Pain     had a fall 2 weeks ago and hit his face, feels like my sinus are connected to my eye, when I blow my nose I feel a vibration in the back of my eye     Reports pain increased this AM when he blew his nose and he feels the vibration in his right eye when he is just speaking.  Denies vision changes.  No trauma to face noted

## 2021-11-09 NOTE — ED PROVIDER NOTES
ED Provider Note    CHIEF COMPLAINT  Chief Complaint   Patient presents with   • Eye Pain     had a fall 2 weeks ago and hit his face, feels like my sinus are connected to my eye, when I blow my nose I feel a vibration in the back of my eye       HPI  Christofer Herrera is a 61 y.o. male who presents stating that 2 weeks ago he fell flat on his face off his deck.  At that time there was no loss of consciousness.  He had swelling of the face which he thought was healing but now when he blows his nose he feels a vibration sensation behind the right eye.  He has had no nausea or vomiting.  He has a very mild headache.  He denies any focal neurologic deficits in his arms or legs.  Denies taking any blood thinning medications.  He denies neck pain.    REVIEW OF SYSTEMS  See HPI for further details. All other systems are negative.     PAST MEDICAL HISTORY   has a past medical history of Arthritis, Asthma, Bronchitis, Cancer (HCC) (2015), Disorder of thyroid, Heart burn, Hernia of abdominal wall, Indigestion, Low back pain, Pre-diabetes, Psychiatric problem, and Sleep apnea.    SOCIAL HISTORY  Social History     Tobacco Use   • Smoking status: Never Smoker   • Smokeless tobacco: Never Used   • Tobacco comment: second hand smoke exposure   Vaping Use   • Vaping Use: Never used   Substance and Sexual Activity   • Alcohol use: Not Currently   • Drug use: No   • Sexual activity: Yes     Partners: Female       SURGICAL HISTORY   has a past surgical history that includes appendectomy (1970); spinal cord stimulator (08/20/2018); lumbar fusion anterior (01/21/2007); implant neurostim/ (N/A, 5/2/2019); and patricia by laparoscopy (10/11/2021).    CURRENT MEDICATIONS  Home Medications     Reviewed by Tonya Johnson R.N. (Registered Nurse) on 11/09/21 at 1018  Med List Status: <None>   Medication Last Dose Status   acetaminophen (TYLENOL) 325 MG Tab  Active   albuterol 108 (90 Base) MCG/ACT Aero Soln inhalation aerosol  Active    busPIRone (BUSPAR) 15 MG tablet  Active   Diclofenac Sodium (PENNSAID) 2 % Solution  Active   diclofenac sodium (VOLTAREN) 1 % Gel  Active   diphenhydrAMINE (BENADRYL) 25 MG Tab  Active   docusate sodium (COLACE) 100 MG Cap  Active   famotidine (PEPCID) 40 MG Tab  Active   FLECTOR 1.3 % Patch  Active   gabapentin (NEURONTIN) 600 MG tablet  Active   levothyroxine (SYNTHROID) 125 MCG Tab  Active   melatonin 1 mg Tab  Active   morphine ER (MS CONTIN) 30 MG Tab CR tablet  Active   ondansetron (ZOFRAN ODT) 8 MG TABLET DISPERSIBLE  Active   oxyCODONE immediate release (ROXICODONE) 10 MG immediate release tablet  Active   polyethylene glycol/lytes (MIRALAX) Pack  Active   Simethicone 125 MG Cap  Active   testosterone cypionate (DEPO-TESTOSTERONE) 200 MG/ML Solution injection  Active                ALLERGIES  Allergies   Allergen Reactions   • Naproxen Swelling   • Prozac [Fluoxetine] Unspecified     Patient states this would make him unemotional         FAMILY HISTORY  No pertinent family history    PHYSICAL EXAM  VITAL SIGNS: /79   Pulse 75   Temp 36.9 °C (98.5 °F) (Temporal)   Resp 16   Wt 108 kg (237 lb 7 oz)   SpO2 93%   BMI 33.59 kg/m²  @YAQUELIN[[id number]::@   Pulse ox interpretation: I interpret this pulse ox as normal.  Constitutional: Alert in no apparent distress.  HENT: No signs of trauma, Bilateral external ears normal, Nose normal.   Eyes: Pupils are equal and reactive, Conjunctiva normal, Non-icteric.  Extraocular muscles are intact.  Neck: Normal range of motion, No tenderness, Supple, No stridor.   Lymphatic: No lymphadenopathy noted.   Skin: Warm, Dry, No erythema, No rash.   Extremities: Intact distal pulses, No edema, No tenderness, No cyanosis.  Musculoskeletal: Good range of motion in all major joints. No tenderness to palpation or major deformities noted.   Neurologic: Alert , Normal motor function, Normal sensory function, No focal deficits noted.   Psychiatric: Affect normal, Judgment normal,  Mood normal.       DIAGNOSTIC STUDIES / PROCEDURES        RADIOLOGY  CT-MAXILLOFACIAL W/O PLUS RECONS   Final Result      There is deformity of the right medial orbital wall that is age indeterminant. There is no stranding of the adjacent orbital fat or effusion in the ethmoid air cells to suggest acuity.              COURSE & MEDICAL DECISION MAKING  Pertinent Labs & Imaging studies reviewed. (See chart for details)    The patient presents with a feeling that there is a communication of air when he blows his nose between his eye on the right.  CT scan was ordered of the facial bones to assess.  He has no evidence of intracranial bleeding.    The patient CT scan shows a subacute medial orbital wall fracture of the right eye.  There is no evidence of infection.  I spoke with Dr. Chakraborty on-call for facial fracture, at this time there is nothing to do.  I encouraged the patient not to blow his nose hard or cause increased pressure.  He will follow-up with his doctor as needed, return to the ER for fever or signs of infection.  Currently has no signs of infection.    The patient will return for new or worsening symptoms and is stable at the time of discharge.    The patient is referred to a primary physician for blood pressure management, diabetic screening, and for all other preventative health concerns.        DISPOSITION:  Patient will be discharged home in stable condition.    FOLLOW UP:  [facility], Emergency Dept  [address] [address] [phone]    If symptoms worsen    Dirk Romo M.D.  [address] [place] [id number] [phone]      As needed    Jarrett Chakraborty D.D.S.  [address] [address] [phone]      If you have any concerns, this is the facial fracture specialist      OUTPATIENT MEDICATIONS:  New Prescriptions    No medications on file         The patient will not drink alcohol nor drive with prescribed medications.  The patient will return for worsening symptoms and is stable at the time of discharge. The patient verbalizes understanding and will comply.    FINAL IMPRESSION  1. Closed fracture of orbit, initial encounter (HCC)                Electronically signed by: Kleber Wynn M.D., 11/9/2021 11:26 AM

## 2021-11-09 NOTE — DISCHARGE INSTRUCTIONS
"Orbital Medial Wall Fracture, Non-Blowout  The eye sits in the part of the skull called the \"orbit.\" The upper and outside walls of the orbit are thick and strong. The inside wall (near the nose) and the orbital floor are very thin and weak. The tissues around the eye will briefly press together if there is a direct blow to the front of the eye. This leads to high pressure against the orbital walls. The inside wall and the orbital floor may break since these are the weakest walls. If the orbital floor breaks, the tissues around the eye, including the muscle that makes the eye look down, may become trapped in the sinus below when the orbital floor \"blows out.\" If a blowout does not happen, the orbital floor fracture is considered a non-blowout orbital fracture.  CAUSES  An orbital medial wall fracture can be caused by any accident in which an object hits the face or the face strikes against a hard object. The most common ways that people break their eye socket include:  · Being hit by a blunt object, such as a baseball bat or a fist.  · Striking the face on the car dashboard during a crash.  · Falls.  · Gunshot.  SYMPTOMS   If there has been no injury to the eye itself, symptoms may include:  · Puffiness (swelling) and bruising around the eye area (black eye).  · Numbness of the cheek and upper gum on the side with the floor fracture. This is caused by nerve injury to these areas.  · Pain around the eye.  · Headache.  · Ear pain on the injured side.  DIAGNOSIS  The diagnosis of an orbital floor fracture is suspected during an eye exam by an ophthalmologist. It is confirmed by X?rays or CT scan.  TREATMENT  Your caregiver may suggest waiting 1 or 2 weeks for the swelling to go away before examining the eye. When the swelling lessens, your caregiver will examine the eye to see if there is any sign of a trapped muscle or double vision when looking in different directions. If double vision is not found and muscle or " tissue did not get trapped, no further treatment is necessary. After that, in almost all cases, the bones heal together on their own.   HOME CARE INSTRUCTIONS  · Take all pain medicine as directed by your caregiver.  · Use ice packs or other cold therapy to reduce swelling as directed by your caregiver.  · Do not put a contact lens in the injured eye until your caregiver approves.  · Avoid chika environments.  · Always wear protective glasses or goggles when recommended. Wearing protective eyewear is not dangerous to your injured eye and will not delay healing.  · As long as your other eye is seeing normally, you may return to work and drive.  · You may travel by plane or be in high altitudes. However, your swelling may take longer to go away, and you may have sinus pain.  · Be aware that your depth perception and your ability to  distance may be reduced or lost.  SEEK IMMEDIATE MEDICAL CARE IF:  · Your vision changes.  · Your redness or swelling persists around the injured eye or gets worse.  · You start to have double vision.  · You have a bloody or discolored discharge from your nose.  · You have a fever that lasts longer than 2 to 3 days.  · You have a fever that suddenly gets worse.  · Your cheek or upper gum numbness does not go away.  MAKE SURE YOU:  · Understand these instructions.  · Will watch your condition.  · Will get help right away if you are not doing well or get worse.  This information is not intended to replace advice given to you by your health care provider. Make sure you discuss any questions you have with your health care provider.  Document Released: 03/11/2013 Document Revised: 01/08/2016 Document Reviewed: 10/26/2016  Elsevier Interactive Patient Education © 2017 [employer]

## 2021-11-19 ENCOUNTER — PATIENT MESSAGE (OUTPATIENT)
Dept: MEDICAL GROUP | Facility: PHYSICIAN GROUP | Age: 61
End: 2021-11-19

## 2021-11-19 DIAGNOSIS — J20.9 ACUTE BRONCHITIS, UNSPECIFIED ORGANISM: ICD-10-CM

## 2021-11-19 NOTE — TELEPHONE ENCOUNTER
From: Christofer Herrera  To: Physician Dirk Romo  Sent: 11/19/2021 5:40 AM PST  Subject: Refill Buspirone/ Please confirm today if possible    Hello Dr. Romo,     I had requested my pharmacy contact you for a 90 day refill of Buspirone over a week ago. I’m not sure if they requested the refill yet or not, they haven’t answered me yet.     Would you please confirm that [facility] ref[facility]? If they can’t fill it this weekend, I will run out.     Thank you,     Christofer Herrera

## 2021-11-22 RX ORDER — BUSPIRONE HYDROCHLORIDE 15 MG/1
TABLET ORAL
Qty: 360 TABLET | Refills: 1 | Status: SHIPPED | OUTPATIENT
Start: 2021-11-22 | End: 2022-05-09

## 2021-11-24 DIAGNOSIS — M54.40 CHRONIC LOW BACK PAIN WITH SCIATICA, SCIATICA LATERALITY UNSPECIFIED, UNSPECIFIED BACK PAIN LATERALITY: ICD-10-CM

## 2021-11-24 DIAGNOSIS — G89.29 CHRONIC LOW BACK PAIN WITH SCIATICA, SCIATICA LATERALITY UNSPECIFIED, UNSPECIFIED BACK PAIN LATERALITY: ICD-10-CM

## 2021-11-24 RX ORDER — ONDANSETRON 8 MG/1
TABLET, ORALLY DISINTEGRATING ORAL
Qty: 30 TABLET | Refills: 0 | Status: SHIPPED | OUTPATIENT
Start: 2021-11-24 | End: 2022-06-17

## 2021-12-04 ENCOUNTER — HOSPITAL ENCOUNTER (OUTPATIENT)
Dept: LAB | Facility: MEDICAL CENTER | Age: 61
End: 2021-12-04
Attending: UROLOGY
Payer: COMMERCIAL

## 2021-12-04 PROCEDURE — 84153 ASSAY OF PSA TOTAL: CPT

## 2021-12-04 PROCEDURE — 36415 COLL VENOUS BLD VENIPUNCTURE: CPT

## 2021-12-04 PROCEDURE — 80053 COMPREHEN METABOLIC PANEL: CPT

## 2021-12-04 PROCEDURE — 84403 ASSAY OF TOTAL TESTOSTERONE: CPT

## 2021-12-04 PROCEDURE — 85025 COMPLETE CBC W/AUTO DIFF WBC: CPT

## 2021-12-06 LAB
ALBUMIN SERPL BCP-MCNC: 4.4 G/DL (ref 3.2–4.9)
ALBUMIN/GLOB SERPL: 2.1 G/DL
ALP SERPL-CCNC: 116 U/L (ref 30–99)
ALT SERPL-CCNC: 22 U/L (ref 2–50)
ANION GAP SERPL CALC-SCNC: 12 MMOL/L (ref 7–16)
AST SERPL-CCNC: 17 U/L (ref 12–45)
BASOPHILS # BLD AUTO: 0.9 % (ref 0–1.8)
BASOPHILS # BLD: 0.06 K/UL (ref 0–0.12)
BILIRUB SERPL-MCNC: 0.3 MG/DL (ref 0.1–1.5)
BUN SERPL-MCNC: 13 MG/DL (ref 8–22)
CALCIUM SERPL-MCNC: 9.2 MG/DL (ref 8.5–10.5)
CHLORIDE SERPL-SCNC: 105 MMOL/L (ref 96–112)
CO2 SERPL-SCNC: 23 MMOL/L (ref 20–33)
CREAT SERPL-MCNC: 1.02 MG/DL (ref 0.5–1.4)
EOSINOPHIL # BLD AUTO: 0.35 K/UL (ref 0–0.51)
EOSINOPHIL NFR BLD: 5.5 % (ref 0–6.9)
ERYTHROCYTE [DISTWIDTH] IN BLOOD BY AUTOMATED COUNT: 43.3 FL (ref 35.9–50)
GLOBULIN SER CALC-MCNC: 2.1 G/DL (ref 1.9–3.5)
GLUCOSE SERPL-MCNC: 119 MG/DL (ref 65–99)
HCT VFR BLD AUTO: 51 % (ref 42–52)
HGB BLD-MCNC: 16.6 G/DL (ref 14–18)
IMM GRANULOCYTES # BLD AUTO: 0.02 K/UL (ref 0–0.11)
IMM GRANULOCYTES NFR BLD AUTO: 0.3 % (ref 0–0.9)
LYMPHOCYTES # BLD AUTO: 1.66 K/UL (ref 1–4.8)
LYMPHOCYTES NFR BLD: 26 % (ref 22–41)
MCH RBC QN AUTO: 31.6 PG (ref 27–33)
MCHC RBC AUTO-ENTMCNC: 32.5 G/DL (ref 33.7–35.3)
MCV RBC AUTO: 97 FL (ref 81.4–97.8)
MONOCYTES # BLD AUTO: 0.41 K/UL (ref 0–0.85)
MONOCYTES NFR BLD AUTO: 6.4 % (ref 0–13.4)
NEUTROPHILS # BLD AUTO: 3.88 K/UL (ref 1.82–7.42)
NEUTROPHILS NFR BLD: 60.9 % (ref 44–72)
NRBC # BLD AUTO: 0 K/UL
NRBC BLD-RTO: 0 /100 WBC
PLATELET # BLD AUTO: 255 K/UL (ref 164–446)
PMV BLD AUTO: 10.5 FL (ref 9–12.9)
POTASSIUM SERPL-SCNC: 4.4 MMOL/L (ref 3.6–5.5)
PROT SERPL-MCNC: 6.5 G/DL (ref 6–8.2)
PSA SERPL-MCNC: 0.23 NG/ML (ref 0–4)
RBC # BLD AUTO: 5.26 M/UL (ref 4.7–6.1)
SODIUM SERPL-SCNC: 140 MMOL/L (ref 135–145)
TESTOST SERPL-MCNC: 172 NG/DL (ref 175–781)
WBC # BLD AUTO: 6.4 K/UL (ref 4.8–10.8)

## 2022-02-12 DIAGNOSIS — J20.9 ACUTE BRONCHITIS, UNSPECIFIED ORGANISM: ICD-10-CM

## 2022-02-14 RX ORDER — ALBUTEROL SULFATE 90 UG/1
2 AEROSOL, METERED RESPIRATORY (INHALATION) EVERY 6 HOURS PRN
Qty: 9 G | Refills: 3 | Status: SHIPPED | OUTPATIENT
Start: 2022-02-14

## 2022-03-08 ENCOUNTER — APPOINTMENT (OUTPATIENT)
Dept: MEDICAL GROUP | Facility: PHYSICIAN GROUP | Age: 62
End: 2022-03-08
Payer: COMMERCIAL

## 2022-05-09 DIAGNOSIS — J20.9 ACUTE BRONCHITIS, UNSPECIFIED ORGANISM: ICD-10-CM

## 2022-05-09 RX ORDER — BUSPIRONE HYDROCHLORIDE 15 MG/1
TABLET ORAL
Qty: 360 TABLET | Refills: 0 | Status: SHIPPED | OUTPATIENT
Start: 2022-05-09 | End: 2022-08-08

## 2022-05-19 DIAGNOSIS — E03.4 HYPOTHYROIDISM DUE TO ACQUIRED ATROPHY OF THYROID: ICD-10-CM

## 2022-05-19 RX ORDER — LEVOTHYROXINE SODIUM 0.12 MG/1
125 TABLET ORAL
Qty: 90 TABLET | Refills: 3 | Status: SHIPPED | OUTPATIENT
Start: 2022-05-19

## 2022-06-06 ENCOUNTER — OFFICE VISIT (OUTPATIENT)
Dept: MEDICAL GROUP | Facility: PHYSICIAN GROUP | Age: 62
End: 2022-06-06
Payer: COMMERCIAL

## 2022-06-06 VITALS
TEMPERATURE: 98.7 F | BODY MASS INDEX: 33.46 KG/M2 | HEIGHT: 71 IN | WEIGHT: 239 LBS | SYSTOLIC BLOOD PRESSURE: 134 MMHG | RESPIRATION RATE: 16 BRPM | OXYGEN SATURATION: 92 % | HEART RATE: 72 BPM | DIASTOLIC BLOOD PRESSURE: 86 MMHG

## 2022-06-06 DIAGNOSIS — G89.29 CHRONIC LOW BACK PAIN WITH SCIATICA, SCIATICA LATERALITY UNSPECIFIED, UNSPECIFIED BACK PAIN LATERALITY: ICD-10-CM

## 2022-06-06 DIAGNOSIS — M54.40 CHRONIC LOW BACK PAIN WITH SCIATICA, SCIATICA LATERALITY UNSPECIFIED, UNSPECIFIED BACK PAIN LATERALITY: ICD-10-CM

## 2022-06-06 DIAGNOSIS — L57.8 SOLAR ELASTOSIS: ICD-10-CM

## 2022-06-06 DIAGNOSIS — Z96.89 SPINAL CORD STIMULATOR STATUS: ICD-10-CM

## 2022-06-06 DIAGNOSIS — E03.4 HYPOTHYROIDISM DUE TO ACQUIRED ATROPHY OF THYROID: ICD-10-CM

## 2022-06-06 DIAGNOSIS — E66.9 OBESITY (BMI 30-39.9): ICD-10-CM

## 2022-06-06 PROCEDURE — 99214 OFFICE O/P EST MOD 30 MIN: CPT | Performed by: FAMILY MEDICINE

## 2022-06-06 RX ORDER — GABAPENTIN 300 MG/1
CAPSULE ORAL
COMMUNITY
End: 2022-06-06

## 2022-06-06 RX ORDER — TESTOSTERONE CYPIONATE 200 MG/ML
VIAL (ML) INTRAMUSCULAR
COMMUNITY
End: 2022-06-06

## 2022-06-06 RX ORDER — BUSPIRONE HYDROCHLORIDE 15 MG/1
TABLET ORAL
COMMUNITY
End: 2022-06-06

## 2022-06-06 RX ORDER — MORPHINE SULFATE 30 MG/1
TABLET ORAL
COMMUNITY
End: 2022-06-06

## 2022-06-06 RX ORDER — DICLOFENAC EPOLAMINE 0.01 G/1
SYSTEM TOPICAL
COMMUNITY
Start: 2022-03-13

## 2022-06-06 ASSESSMENT — ENCOUNTER SYMPTOMS
CONSTITUTIONAL NEGATIVE: 1
NAUSEA: 0
CARDIOVASCULAR NEGATIVE: 1
BLURRED VISION: 0
PALPITATIONS: 0
DIZZINESS: 0
FEVER: 0
HEMOPTYSIS: 0
BACK PAIN: 1
DEPRESSION: 0
PSYCHIATRIC NEGATIVE: 1
GASTROINTESTINAL NEGATIVE: 1
DOUBLE VISION: 0
NEUROLOGICAL NEGATIVE: 1
TINGLING: 0
EYES NEGATIVE: 1
MYALGIAS: 0
BRUISES/BLEEDS EASILY: 0
CHILLS: 0
HEADACHES: 0
COUGH: 0
RESPIRATORY NEGATIVE: 1
HEARTBURN: 0

## 2022-06-06 ASSESSMENT — FIBROSIS 4 INDEX: FIB4 SCORE: 0.9

## 2022-06-06 ASSESSMENT — PATIENT HEALTH QUESTIONNAIRE - PHQ9: CLINICAL INTERPRETATION OF PHQ2 SCORE: 0

## 2022-06-06 NOTE — PROGRESS NOTES
Subjective     Christofer Herrera is a 62 y.o. male who presents with Nevus (Mole concerns) and Hypertension Follow-up (Concerns for elevated BP)            1. Hypothyroidism due to acquired atrophy of thyroid  Patient is being treated for hypothyroidism, currently taking meds, no symptoms of fast or slow heartbeat and energy level steady. No new hair loss or skin symptoms. Labs have been checked in past year and are stable on current dose.  controlled      2. Chronic low back pain with sciatica, sciatica laterality unspecified, unspecified back pain laterality  Seeing spine specialist     3. Spinal cord stimulator status      4. Obesity (BMI 30-39.9)  Patient has a diagnosis of obesity. They were counseled today on increasing exercise and  extensively counseled on a low carb diet      Patient identified as having weight management issue.  Appropriate orders and counseling given.    5. Solar elastosis     Referral to Dermatology    Past Medical History:  No date: Arthritis      Comment:  lumbar  No date: Asthma      Comment:  occasional  No date: Bronchitis      Comment:  2017 2015: Cancer (HCC)      Comment:  treated with brachytherapy prostate  No date: Disorder of thyroid      Comment:  hypothryroid  No date: Heart burn  No date: Hernia of abdominal wall  No date: Indigestion      Comment:  occasional  No date: Low back pain      Comment:  low back pain  No date: Pre-diabetes  No date: Psychiatric problem      Comment:  anxiety  No date: Sleep apnea      Comment:  CPAP  Past Surgical History:  10/11/2021: YUDITH BY LAPAROSCOPY      Comment:  Procedure: CHOLECYSTECTOMY, LAPAROSCOPIC;  Surgeon: Roshan Ibrahim M.D.;  Location: [facility];                 Service: General  5/2/2019: PB IMPLANT NEUROSTIM/; N/A      Comment:  Procedure: INSERTION, NEUROSTIMULATOR, PERMANENT, SPINAL               CORD - LEAD REVISION;  Surgeon: Cristin Smart M.D.;                 Location: [facility];  Service: Pain                Management  08/20/2018: SPINAL CORD STIMULATOR      Comment:  failed lead, off as of 12/2018 01/21/2007: LUMBAR FUSION ANTERIOR  1970: APPENDECTOMY  Social History    Tobacco Use      Smoking status: Never Smoker      Smokeless tobacco: Never Used      Tobacco comment: second hand smoke exposure    Vaping Use      Vaping Use: Never used    Alcohol use: Not Currently    Drug use: No    Review of patient's family history indicates:  Problem: Hypertension      Relation: Brother          Age of Onset: 16  Problem: Heart Attack      Relation: Paternal Grandfather          Age of Onset: (Not Specified)      Current Outpatient Medications: •  diclofenac epolamine 1.3 % Patch, TAKE 1 TRANSDERMAL PATCH ONE PER DAY AS NEEDED FOR PAIN, Disp: , Rfl: •  levothyroxine (SYNTHROID) 125 MCG Tab, Take 1 Tablet by mouth every morning on an empty stomach., Disp: 90 Tablet, Rfl: 3•  busPIRone (BUSPAR) 15 MG tablet, TAKE ONE TABLET BY MOUTH FOUR TIMES DAILY, Disp: 360 Tablet, Rfl: 0•  albuterol 108 (90 Base) MCG/ACT Aero Soln inhalation aerosol, INHALE 2 PUFFS BY MOUTH EVERY 6 HOURS AS NEEDED FOR SHORTNESS OF BREATH., Disp: 9 g, Rfl: 3•  ondansetron (ZOFRAN ODT) 8 MG TABLET DISPERSIBLE, DISSOLVE ONE TABLET IN MOUTH EVERY TWELVE HOURS AS NEEDED FOR NAUSEA, Disp: 30 Tablet, Rfl: 0•  diclofenac sodium (VOLTAREN) 1 % Gel, Apply  topically 4 times a day as needed., Disp: , Rfl: •  diphenhydrAMINE (BENADRYL) 25 MG Tab, Take 25 mg by mouth every 6 hours as needed for Sleep., Disp: , Rfl: •  melatonin 1 mg Tab, Take 5 mg by mouth., Disp: , Rfl: •  Simethicone 125 MG Cap, Take 1 Capsule by mouth 4 Times a Day,Before Meals and at Bedtime., Disp: 120 Capsule, Rfl: 3•  morphine ER (MS CONTIN) 30 MG Tab CR tablet, , Disp: , Rfl: •  gabapentin (NEURONTIN) 600 MG tablet, Take 600 mg by mouth 3 times a day., Disp: , Rfl: •  testosterone cypionate (DEPO-TESTOSTERONE) 200 MG/ML Solution injection, Inject 100 mg  "into the shoulder, thigh, or buttocks every 14 days., Disp: , Rfl: •  polyethylene glycol/lytes (MIRALAX) Pack, Take 17 g by mouth every day., Disp: , Rfl: •  oxyCODONE immediate release (ROXICODONE) 10 MG immediate release tablet, Take 10 mg by mouth 4 times a day as needed for Moderate Pain., Disp: , Rfl: •  famotidine (PEPCID) 40 MG Tab, Take 40 mg by mouth as needed., Disp: , Rfl:     Patient was instructed on the use of medications, either prescriptions or OTC and informed on when the appropriate follow up time period should be. In addition, patient was also instructed that should any acute worsening occur that they should notify this clinic asap or call 911.          Review of Systems   Constitutional: Negative.  Negative for chills and fever.   HENT: Negative.  Negative for hearing loss.    Eyes: Negative.  Negative for blurred vision and double vision.   Respiratory: Negative.  Negative for cough and hemoptysis.    Cardiovascular: Negative.  Negative for chest pain and palpitations.   Gastrointestinal: Negative.  Negative for heartburn and nausea.   Genitourinary: Negative.  Negative for dysuria.   Musculoskeletal: Positive for back pain. Negative for myalgias.   Skin: Negative.  Negative for rash.   Neurological: Negative.  Negative for dizziness, tingling and headaches.   Endo/Heme/Allergies: Negative.  Does not bruise/bleed easily.   Psychiatric/Behavioral: Negative.  Negative for depression and suicidal ideas.   All other systems reviewed and are negative.             Objective     /86 (BP Location: Left arm, Patient Position: Sitting, BP Cuff Size: Adult)   Pulse 72   Temp 37.1 °C (98.7 °F) (Temporal)   Resp 16   Ht 1.791 m (5' 10.5\")   Wt 108 kg (239 lb)   SpO2 92%   BMI 33.81 kg/m²      Physical Exam  Vitals and nursing note reviewed.   Constitutional:       General: He is not in acute distress.     Appearance: He is well-developed. He is not diaphoretic.   HENT:      Head: Normocephalic " and atraumatic.      Mouth/Throat:      Pharynx: No oropharyngeal exudate.   Eyes:      Pupils: Pupils are equal, round, and reactive to light.   Cardiovascular:      Rate and Rhythm: Normal rate and regular rhythm.      Heart sounds: Normal heart sounds. No murmur heard.    No friction rub. No gallop.   Pulmonary:      Effort: Pulmonary effort is normal. No respiratory distress.      Breath sounds: Normal breath sounds. No wheezing or rales.   Chest:      Chest wall: No tenderness.   Neurological:      Mental Status: He is alert and oriented to person, place, and time.   Psychiatric:         Behavior: Behavior normal.         Thought Content: Thought content normal.         Judgment: Judgment normal.                             Assessment & Plan        1. Hypothyroidism due to acquired atrophy of thyroid      2. Chronic low back pain with sciatica, sciatica laterality unspecified, unspecified back pain laterality      3. Spinal cord stimulator status      4. Obesity (BMI 30-39.9)    - Patient identified as having weight management issue.  Appropriate orders and counseling given.    5. Solar elastosis    - Referral to Dermatology

## 2022-06-10 ENCOUNTER — HOSPITAL ENCOUNTER (OUTPATIENT)
Dept: LAB | Facility: MEDICAL CENTER | Age: 62
End: 2022-06-10
Attending: UROLOGY
Payer: COMMERCIAL

## 2022-06-10 LAB
BASOPHILS # BLD AUTO: 0.6 % (ref 0–1.8)
BASOPHILS # BLD: 0.04 K/UL (ref 0–0.12)
EOSINOPHIL # BLD AUTO: 0.25 K/UL (ref 0–0.51)
EOSINOPHIL NFR BLD: 3.9 % (ref 0–6.9)
ERYTHROCYTE [DISTWIDTH] IN BLOOD BY AUTOMATED COUNT: 44.4 FL (ref 35.9–50)
HCT VFR BLD AUTO: 48.2 % (ref 42–52)
HGB BLD-MCNC: 16 G/DL (ref 14–18)
IMM GRANULOCYTES # BLD AUTO: 0.02 K/UL (ref 0–0.11)
IMM GRANULOCYTES NFR BLD AUTO: 0.3 % (ref 0–0.9)
LYMPHOCYTES # BLD AUTO: 1.53 K/UL (ref 1–4.8)
LYMPHOCYTES NFR BLD: 24.1 % (ref 22–41)
MCH RBC QN AUTO: 31.7 PG (ref 27–33)
MCHC RBC AUTO-ENTMCNC: 33.2 G/DL (ref 33.7–35.3)
MCV RBC AUTO: 95.6 FL (ref 81.4–97.8)
MONOCYTES # BLD AUTO: 0.32 K/UL (ref 0–0.85)
MONOCYTES NFR BLD AUTO: 5 % (ref 0–13.4)
NEUTROPHILS # BLD AUTO: 4.2 K/UL (ref 1.82–7.42)
NEUTROPHILS NFR BLD: 66.1 % (ref 44–72)
NRBC # BLD AUTO: 0 K/UL
NRBC BLD-RTO: 0 /100 WBC
PLATELET # BLD AUTO: 247 K/UL (ref 164–446)
PMV BLD AUTO: 10.6 FL (ref 9–12.9)
PSA SERPL-MCNC: 0.33 NG/ML (ref 0–4)
RBC # BLD AUTO: 5.04 M/UL (ref 4.7–6.1)
TESTOST SERPL-MCNC: 725 NG/DL (ref 175–781)
WBC # BLD AUTO: 6.4 K/UL (ref 4.8–10.8)

## 2022-06-10 PROCEDURE — 84153 ASSAY OF PSA TOTAL: CPT

## 2022-06-10 PROCEDURE — 36415 COLL VENOUS BLD VENIPUNCTURE: CPT

## 2022-06-10 PROCEDURE — 85025 COMPLETE CBC W/AUTO DIFF WBC: CPT

## 2022-06-10 PROCEDURE — 84403 ASSAY OF TOTAL TESTOSTERONE: CPT

## 2022-06-20 RX ORDER — ONDANSETRON 8 MG/1
8 TABLET, ORALLY DISINTEGRATING ORAL EVERY 12 HOURS PRN
Qty: 30 TABLET | Refills: 0 | Status: SHIPPED | OUTPATIENT
Start: 2022-06-20

## 2022-06-27 ENCOUNTER — OFFICE VISIT (OUTPATIENT)
Dept: MEDICAL GROUP | Facility: PHYSICIAN GROUP | Age: 62
End: 2022-06-27
Payer: COMMERCIAL

## 2022-06-27 VITALS
TEMPERATURE: 97.9 F | WEIGHT: 237.66 LBS | HEIGHT: 71 IN | SYSTOLIC BLOOD PRESSURE: 142 MMHG | DIASTOLIC BLOOD PRESSURE: 82 MMHG | HEART RATE: 69 BPM | BODY MASS INDEX: 33.27 KG/M2 | RESPIRATION RATE: 16 BRPM | OXYGEN SATURATION: 93 %

## 2022-06-27 DIAGNOSIS — L98.9 SKIN ABNORMALITIES: ICD-10-CM

## 2022-06-27 PROCEDURE — 99212 OFFICE O/P EST SF 10 MIN: CPT | Performed by: STUDENT IN AN ORGANIZED HEALTH CARE EDUCATION/TRAINING PROGRAM

## 2022-06-27 ASSESSMENT — FIBROSIS 4 INDEX: FIB4 SCORE: 0.92

## 2022-06-28 PROBLEM — L98.9 SKIN ABNORMALITIES: Status: ACTIVE | Noted: 2022-06-28

## 2022-06-28 NOTE — ASSESSMENT & PLAN NOTE
Slightly erythematous nodule on abdomen no signs of acute infection.  Advised to continue using Neosporin as needed and should resolve on its own    2 cm in diameter erosion of the skin on his back with no discharge or weeping, erythema or tenderness.  Should heal well on its own.  Advised to abstain from using ice directly on the skin.  Try to use a insulated medical ice pouch which helps prevent these issues.  He understands and agrees

## 2022-06-28 NOTE — PROGRESS NOTES
HISTORY OF PRESENT ILLNESS: Christofer is a pleasant 62 y.o. male, established patient who presents today to discuss medical problems as listed below:    Problem   Skin Abnormalities    Patient developed 2 skin lesions.    The first 1 patient reports that he started wearing suspenders about a week ago and developed a rash on the left quadrant medial side of his abdomen.  It was large inflamed red and erythematous but he reports he has been putting Neosporin on it and it has now subsided.  Is no longer painful and the erythema has gone down significantly.  Here denies any discharge or fevers.    Second skin lesion located on his back.  He has chronic back pain and is an ice pack for relief.  1 night he fell asleep with the ice pack on his back and it seems to have burned his skin.  He reports that it is healing well and is not painful.              Current Outpatient Medications Ordered in Epic   Medication Sig Dispense Refill   • ondansetron (ZOFRAN ODT) 8 MG TABLET DISPERSIBLE Take 1 Tablet by mouth every 12 hours as needed for Nausea. 30 Tablet 0   • diclofenac epolamine 1.3 % Patch TAKE 1 TRANSDERMAL PATCH ONE PER DAY AS NEEDED FOR PAIN     • levothyroxine (SYNTHROID) 125 MCG Tab Take 1 Tablet by mouth every morning on an empty stomach. 90 Tablet 3   • busPIRone (BUSPAR) 15 MG tablet TAKE ONE TABLET BY MOUTH FOUR TIMES DAILY 360 Tablet 0   • albuterol 108 (90 Base) MCG/ACT Aero Soln inhalation aerosol INHALE 2 PUFFS BY MOUTH EVERY 6 HOURS AS NEEDED FOR SHORTNESS OF BREATH. 9 g 3   • diclofenac sodium (VOLTAREN) 1 % Gel Apply  topically 4 times a day as needed.     • diphenhydrAMINE (BENADRYL) 25 MG Tab Take 25 mg by mouth every 6 hours as needed for Sleep.     • melatonin 1 mg Tab Take 5 mg by mouth.     • Simethicone 125 MG Cap Take 1 Capsule by mouth 4 Times a Day,Before Meals and at Bedtime. 120 Capsule 3   • morphine ER (MS CONTIN) 30 MG Tab CR tablet      • gabapentin (NEURONTIN) 600 MG tablet Take 600 mg by  mouth 3 times a day.     • testosterone cypionate (DEPO-TESTOSTERONE) 200 MG/ML Solution injection Inject 100 mg into the shoulder, thigh, or buttocks every 14 days.     • polyethylene glycol/lytes (MIRALAX) Pack Take 17 g by mouth every day.     • oxyCODONE immediate release (ROXICODONE) 10 MG immediate release tablet Take 10 mg by mouth 4 times a day as needed for Moderate Pain.     • famotidine (PEPCID) 40 MG Tab Take 40 mg by mouth as needed.       No current Epic-ordered facility-administered medications on file.       ROS per HPI    Past Medical History:   Diagnosis Date   • Arthritis     lumbar   • Asthma     occasional   • Bronchitis     2017   • Cancer (HCC) 2015    treated with brachytherapy prostate   • Disorder of thyroid     hypothryroid   • Heart burn    • Hernia of abdominal wall    • Indigestion     occasional   • Low back pain     low back pain   • Pre-diabetes    • Psychiatric problem     anxiety   • Sleep apnea     CPAP     Past Surgical History:   Procedure Laterality Date   • YUDITH BY LAPAROSCOPY  10/11/2021    Procedure: CHOLECYSTECTOMY, LAPAROSCOPIC;  Surgeon: Roshan Ibrahim M.D.;  [facility]: [facility];  Service: General   • PB IMPLANT NEUROSTIM/ N/A 5/2/2019    Procedure: INSERTION, NEUROSTIMULATOR, PERMANENT, SPINAL CORD - LEAD REVISION;  Surgeon: Cristin Smart M.D.;  Location: [facility];  Service: Pain Management   • SPINAL CORD STIMULATOR  08/20/2018    failed lead, off as of 12/2018   • LUMBAR FUSION ANTERIOR  01/21/2007   • APPENDECTOMY  1970     Social History     Tobacco Use   • Smoking status: Never Smoker   • Smokeless tobacco: Never Used   • Tobacco comment: second hand smoke exposure   Vaping Use   • Vaping Use: Never used   Substance Use Topics   • Alcohol use: Not Currently   • Drug use: No      Family History   Problem Relation Age of Onset   • Hypertension Brother 16   • Heart Attack Paternal Grandfather      Current Outpatient Medications    Medication Sig Dispense Refill   • ondansetron (ZOFRAN ODT) 8 MG TABLET DISPERSIBLE Take 1 Tablet by mouth every 12 hours as needed for Nausea. 30 Tablet 0   • diclofenac epolamine 1.3 % Patch TAKE 1 TRANSDERMAL PATCH ONE PER DAY AS NEEDED FOR PAIN     • levothyroxine (SYNTHROID) 125 MCG Tab Take 1 Tablet by mouth every morning on an empty stomach. 90 Tablet 3   • busPIRone (BUSPAR) 15 MG tablet TAKE ONE TABLET BY MOUTH FOUR TIMES DAILY 360 Tablet 0   • albuterol 108 (90 Base) MCG/ACT Aero Soln inhalation aerosol INHALE 2 PUFFS BY MOUTH EVERY 6 HOURS AS NEEDED FOR SHORTNESS OF BREATH. 9 g 3   • diclofenac sodium (VOLTAREN) 1 % Gel Apply  topically 4 times a day as needed.     • diphenhydrAMINE (BENADRYL) 25 MG Tab Take 25 mg by mouth every 6 hours as needed for Sleep.     • melatonin 1 mg Tab Take 5 mg by mouth.     • Simethicone 125 MG Cap Take 1 Capsule by mouth 4 Times a Day,Before Meals and at Bedtime. 120 Capsule 3   • morphine ER (MS CONTIN) 30 MG Tab CR tablet      • gabapentin (NEURONTIN) 600 MG tablet Take 600 mg by mouth 3 times a day.     • testosterone cypionate (DEPO-TESTOSTERONE) 200 MG/ML Solution injection Inject 100 mg into the shoulder, thigh, or buttocks every 14 days.     • polyethylene glycol/lytes (MIRALAX) Pack Take 17 g by mouth every day.     • oxyCODONE immediate release (ROXICODONE) 10 MG immediate release tablet Take 10 mg by mouth 4 times a day as needed for Moderate Pain.     • famotidine (PEPCID) 40 MG Tab Take 40 mg by mouth as needed.       No current facility-administered medications for this visit.       Allergies:  Allergies   Allergen Reactions   • Naproxen Swelling   • Ibuprofen Unspecified   • Prozac [Fluoxetine] Unspecified     Patient states this would make him unemotional         Allergies, past medical history, past surgical history, family history, social history reviewed and updated.    Objective:    BP (!) 142/82 (BP Location: Right arm, Patient Position: Sitting, BP  "Cuff Size: Adult)   Pulse 69   Temp 36.6 °C (97.9 °F) (Temporal)   Resp 16   Ht 1.791 m (5' 10.5\")   Wt 108 kg (237 lb 10.5 oz)   SpO2 93%   BMI 33.62 kg/m²    Body mass index is 33.62 kg/m².    Physical Exam  Skin:     Comments: Slightly erythematous nodule on abdomen, no discharge, no tenderness or warmth.    2 cm skin erosion lumbar spine, 2 satellite 0.5 cm diameter lesions around it.  No discharge or weeping, slightly erythematous edges, no tenderness.          Assessment/Plan:    Problem List Items Addressed This Visit     Skin abnormalities     Slightly erythematous nodule on abdomen no signs of acute infection.  Advised to continue using Neosporin as needed and should resolve on its own    2 cm in diameter erosion of the skin on his back with no discharge or weeping, erythema or tenderness.  Should heal well on its own.  Advised to abstain from using ice directly on the skin.  Try to use a insulated medical ice pouch which helps prevent these issues.  He understands and agrees                  Return if symptoms worsen or fail to improve.     "

## 2022-08-01 ENCOUNTER — TELEPHONE (OUTPATIENT)
Dept: CARDIOLOGY | Facility: MEDICAL CENTER | Age: 62
End: 2022-08-01
Payer: COMMERCIAL

## 2022-08-01 DIAGNOSIS — R93.1 AGATSTON CORONARY ARTERY CALCIUM SCORE LESS THAN 100: ICD-10-CM

## 2022-08-01 NOTE — TELEPHONE ENCOUNTER
SC    Caller: Christofer    Topic/issue: Pt called and stated Dr. Diaz wanted him to get a cardiac scoring test done every year from the last time they spoke. So he is asking for JM to submit another order to get scheduled.    Callback Number: [phone]

## 2022-08-07 DIAGNOSIS — J20.9 ACUTE BRONCHITIS, UNSPECIFIED ORGANISM: ICD-10-CM

## 2022-08-08 RX ORDER — BUSPIRONE HYDROCHLORIDE 15 MG/1
TABLET ORAL
Qty: 360 TABLET | Refills: 0 | Status: SHIPPED | OUTPATIENT
Start: 2022-08-08

## 2022-08-19 ENCOUNTER — HOSPITAL ENCOUNTER (OUTPATIENT)
Dept: RADIOLOGY | Facility: MEDICAL CENTER | Age: 62
End: 2022-08-19
Attending: NURSE PRACTITIONER
Payer: COMMERCIAL

## 2022-08-19 DIAGNOSIS — R93.1 AGATSTON CORONARY ARTERY CALCIUM SCORE LESS THAN 100: ICD-10-CM

## 2022-08-19 PROCEDURE — 4410556 CT-CARDIAC SCORING (SELF PAY ONLY)

## 2022-08-23 ENCOUNTER — TELEPHONE (OUTPATIENT)
Dept: CARDIOLOGY | Facility: MEDICAL CENTER | Age: 62
End: 2022-08-23
Payer: COMMERCIAL

## 2022-08-23 DIAGNOSIS — R93.1 AGATSTON CORONARY ARTERY CALCIUM SCORE LESS THAN 100: ICD-10-CM

## 2022-08-23 NOTE — TELEPHONE ENCOUNTER
Lab orders placed and put in mail for patient. Spoke to patient over phone and reviewed recommendations. Patient verbalizes understanding and requests that follow up appointment be cancelled.

## 2022-08-23 NOTE — TELEPHONE ENCOUNTER
Spoke to patient over phone. Results reviewed. Patient verbalizes understanding and states he has recently change his diet drastically. He is no longer eating red meat and has switched to fish and eggs for his protein. Patient is wondering if his egg intake caused his score to be elevated. Additionally, he has quadrupled his intake of vegetables. He would like SC to be aware of changes.    Per patient, [place] at the end of the month and his insurance will change. He is wondering if he would be able to see SC prior to move.           To SC: Please advise

## 2022-08-23 NOTE — TELEPHONE ENCOUNTER
----- Message from PORSHA Hughes sent at 8/22/2022  2:08 PM PDT -----  Mild CAD noted in LAD, follow up on 9/20 as planned. Follow up on BP and symptoms. Would recommend with slight increase in LAD scoring to start statin and baby ASA for management. Can be discussed at next apt though. SC

## 2022-08-23 NOTE — TELEPHONE ENCOUNTER
He can continue with these diet changes. OK to eat eggs but recommend more egg whites v. Egg yolks for cholesterol balance. Have him repeat a lipid/cmp in 6 months for review and then consider start of medications at that time with his new MD if he is hesitant to start now. I will be unable to see him before his move. SC

## (undated) DEVICE — CONTAINER, SPECIMEN, STERILE

## (undated) DEVICE — BLADE SURGICAL #15 - (50/BX 3BX/CA)

## (undated) DEVICE — GLOVE BIOGEL SZ 6.5 SURGICAL PF LTX (50PR/BX 4BX/CA)

## (undated) DEVICE — SODIUM CHL IRRIGATION 0.9% 1000ML (12EA/CA)

## (undated) DEVICE — KIT ROOM DECONTAMINATION

## (undated) DEVICE — TUBING CLEARLINK DUO-VENT - C-FLO (48EA/CA)

## (undated) DEVICE — DRESSING NON ADHERENT 3 X 4 - STERILE (100/BX 12BX/CA)

## (undated) DEVICE — CANNULA W/SEAL 5X100 Z-THRE - ADED KII (12/BX)

## (undated) DEVICE — GLOVE BIOGEL PI INDICATOR SZ 8.0 SURGICAL PF LF -(50/BX 4BX/CA)

## (undated) DEVICE — LACTATED RINGERS INJ 1000 ML - (14EA/CA 60CA/PF)

## (undated) DEVICE — SUTURE 0 ETHIBOND CT-1 - (12/BX) 18 INCH

## (undated) DEVICE — SUCTION INSTRUMENT YANKAUER BULBOUS TIP W/O VENT (50EA/CA)

## (undated) DEVICE — GLOVE BIOGEL PI INDICATOR SZ 7.5 SURGICAL PF LF -(50/BX 4BX/CA)

## (undated) DEVICE — SET EXTENSION WITH 2 PORTS (48EA/CA) ***PART #2C8610 IS A SUBSTITUTE*****

## (undated) DEVICE — NEPTUNE 4 PORT MANIFOLD - (20/PK)

## (undated) DEVICE — GLOVES, #7 1/2 SENSICARE

## (undated) DEVICE — NEEDLE NON SAFETY HYPO 22 GA X 1 1/2 IN (100/BX)

## (undated) DEVICE — SPONGE GAUZE STER 4X4 8-PL - (2/PK 50PK/BX 12BX/CS)

## (undated) DEVICE — DRAPE LAPAROTOMY T SHEET - (12EA/CA)

## (undated) DEVICE — ELECTRODE 5MM LHK LAPSCP STERILE DISP- MEGADYNE  (5/CA)

## (undated) DEVICE — KIT ANESTHESIA W/CIRCUIT & 3/LT BAG W/FILTER (20EA/CA)

## (undated) DEVICE — MASK ANESTHESIA ADULT  - (100/CA)

## (undated) DEVICE — ELECTRODE 850 FOAM ADHESIVE - HYDROGEL RADIOTRNSPRNT (50/PK)

## (undated) DEVICE — NEEDLE SPINAL NON-SAFETY 25GA X 3 (25EA/BX)"

## (undated) DEVICE — ELECTRODE DUAL RETURN W/ CORD - (50/PK)

## (undated) DEVICE — PACK MINOR BASIN - (2EA/CA)

## (undated) DEVICE — GLOVE BIOGEL SZ 7.5 SURGICAL PF LTX - (50PR/BX 4BX/CA)

## (undated) DEVICE — SET LEADWIRE 5 LEAD BEDSIDE DISPOSABLE ECG (1SET OF 5/EA)

## (undated) DEVICE — SYRINGE LOSS OF RESIST. 50/BX - (50/CA)

## (undated) DEVICE — SUTURE 0 ETHIBOND MO6 C/R - (12/BX) 8-18 INCH ETHICON

## (undated) DEVICE — SYSTEM CLEARIFY VISUALIZATION (10EA/PK)

## (undated) DEVICE — SUTURE 0 VICRYL PLUS UR-6 - 27 INCH (36/BX)

## (undated) DEVICE — TOWEL STOP TIMEOUT SAFETY FLAG (40EA/CA)

## (undated) DEVICE — GLOVE BIOGEL PI ULTRATOUCH SZ 7.0 SURGICAL PF LF- POWDER FREE (50/BX 4BX/CA)

## (undated) DEVICE — GUIDE TRACHE TUBE INTUBATING STYLET 5.0-10.0MM 14FR (20EA/PK)

## (undated) DEVICE — SUTURE 3-0 VICRYL PLUS SH - 27 INCH (36/BX)

## (undated) DEVICE — GLOVE BIOGEL INDICATOR SZ 8 SURGICAL PF LTX - (50/BX 4BX/CA)

## (undated) DEVICE — SUTURE 4-0 MONOCRYL PLUS PS-2 - 27 INCH (36/BX)

## (undated) DEVICE — SUTURE GENERAL

## (undated) DEVICE — SUTURE 2-0 VICRYL PLUS CT-1 36 (36PK/BX)"

## (undated) DEVICE — GLOVE BIOGEL PI ULTRATOUCH SZ 7.5 SURGICAL PF LF -(50/BX 4BX/CA)

## (undated) DEVICE — DERMABOND ADVANCED - (12EA/BX)

## (undated) DEVICE — APPLIER 5MM MED/LARGE CLIP - (3/BX)

## (undated) DEVICE — TUBE E-T HI-LO CUFF 8.0MM (10EA/PK)

## (undated) DEVICE — SENSOR SPO2 NEO LNCS ADHESIVE (20/BX) SEE USER NOTES

## (undated) DEVICE — HEAD HOLDER JUNIOR/ADULT

## (undated) DEVICE — SLEEVE, VASO, THIGH, MED

## (undated) DEVICE — CANISTER SUCTION RIGID RED 1500CC (40EA/CA)

## (undated) DEVICE — CHLORAPREP 26 ML APPLICATOR - ORANGE TINT(25/CA)

## (undated) DEVICE — PROTECTOR ULNA NERVE - (36PR/CA)

## (undated) DEVICE — DRAPE C-ARM LARGE 41IN X 74 IN - (10/BX 2BX/CA)

## (undated) DEVICE — TROCAR 5X100 NON BLADED Z-TH - READ KII (6/BX)

## (undated) DEVICE — TROCAR LAPSCP 100MM 12MM NTHRD - (6/BX)

## (undated) DEVICE — SET SUCTION/IRRIGATION WITH DISPOSABLE TIP (6/CA )PART #0250-070-520 IS A SUB

## (undated) DEVICE — HUMID-VENT HEAT AND MOISTURE EXCHANGE- (50/BX)

## (undated) DEVICE — TORQUE WRENCH

## (undated) DEVICE — DRAPE STRLE REG TOWEL 18X24 - (10/BX 4BX/CA)"

## (undated) DEVICE — BLADE SURGICAL #10 - (50/BX)

## (undated) DEVICE — SCISSORS 5MM CVD (6EA/BX)

## (undated) DEVICE — GOWN WARMING STANDARD FLEX - (30/CA)

## (undated) DEVICE — TROCAR 5X100 BLADED Z-THREAD - KII (6/BX)

## (undated) DEVICE — GLOVE BIOGEL SZ 8 SURGICAL PF LTX - (50PR/BX 4BX/CA)

## (undated) DEVICE — SYRINGE 10 ML CONTROL LL (25EA/BX 4BX/CA)

## (undated) DEVICE — GLOVE BIOGEL PI INDICATOR SZ 7.0 SURGICAL PF LF - (50/BX 4BX/CA)

## (undated) DEVICE — BANDAGE ROLL STERILE BULKEE 4.5 IN X 4 YD (100EA/CA)

## (undated) DEVICE — DRESSING TRANSPARENT FILM TEGADERM 4 X 4.75" (50EA/BX)"

## (undated) DEVICE — CANISTER SUCTION 3000ML MECHANICAL FILTER AUTO SHUTOFF MEDI-VAC NONSTERILE LF DISP  (40EA/CA)

## (undated) DEVICE — DRAPE IOBAN II INCISE 23X17 - (10EA/BX 4BX/CA)

## (undated) DEVICE — Device